# Patient Record
Sex: MALE | Race: WHITE | NOT HISPANIC OR LATINO | Employment: OTHER | URBAN - METROPOLITAN AREA
[De-identification: names, ages, dates, MRNs, and addresses within clinical notes are randomized per-mention and may not be internally consistent; named-entity substitution may affect disease eponyms.]

---

## 2020-04-17 ENCOUNTER — NURSE TRIAGE (OUTPATIENT)
Dept: ADMINISTRATIVE | Facility: CLINIC | Age: 65
End: 2020-04-17

## 2020-04-17 NOTE — TELEPHONE ENCOUNTER
Patient reports cough, loss of smell, muscle pain, fever (100.5), and joint pain. Reports he was recently treated for sinusitis. He reported feeling SOB. Per protocol I advised him to be seen in UC/ED for further evaluation of symptoms. He verbalizes understanding and will call back with any questions.    Reason for Disposition   HIGH RISK patient (e.g., age > 64 years, diabetes, heart or lung disease, weak immune system)    Additional Information   Negative: Difficult to awaken or acting confused (e.g., disoriented, slurred speech)   Negative: SEVERE difficulty breathing (e.g., struggling for each breath, speaks in single words)   Negative: Bluish (or gray) lips or face now   Negative: Shock suspected (e.g., cold/pale/clammy skin, too weak to stand, low BP, rapid pulse)   Negative: Sounds like a life-threatening emergency to the triager   Negative: SEVERE or constant chest pain (Exception: mild central chest pain, present only when coughing)   Negative: MODERATE difficulty breathing (e.g., speaks in phrases, SOB even at rest, pulse 100-120)   Negative: MILD difficulty breathing (e.g., minimal/no SOB at rest, SOB with walking, pulse <100)   Negative: [1] Fever > 101 F (38.3 C) AND [2] age > 60   Negative: Fever > 103 F (39.4 C)   Negative: Chest pain    Protocols used: CORONAVIRUS (COVID-19) DIAGNOSED OR [id number]

## 2020-04-20 ENCOUNTER — HISTORICAL (OUTPATIENT)
Dept: ADMINISTRATIVE | Facility: HOSPITAL | Age: 65
End: 2020-04-20

## 2020-04-20 LAB
ADENOVIRUS: NOT DETECTED
BORDETELLA PARAPERTUSSIS (IS1001): NOT DETECTED
CHLAMYDIA PNEUMONIAE: NOT DETECTED
CORONAVIRUS 229E, COMMON COLD VIRUS: NOT DETECTED
CORONAVIRUS HKU1, COMMON COLD VIRUS: NOT DETECTED
CORONAVIRUS NL63, COMMON COLD VIRUS: NOT DETECTED
CORONAVIRUS OC43, COMMON COLD VIRUS: NOT DETECTED
FLUBV RNA NPH QL NAA+NON-PROBE: NOT DETECTED
HPIV1 RNA NPH QL NAA+NON-PROBE: NOT DETECTED
HPIV2 RNA NPH QL NAA+NON-PROBE: NOT DETECTED
HPIV3 RNA NPH QL NAA+NON-PROBE: NOT DETECTED
HPIV4 RNA NPH QL NAA+NON-PROBE: NOT DETECTED
HUMAN METAPNEUMOVIRUS: NOT DETECTED
INFLUENZA A, MOLECULAR: NOT DETECTED
MYCOPLASMA PNEUMONIAE: NOT DETECTED
RESPIRATORY PANEL CONTROL: NORMAL
RSV RNA NPH QL NAA+NON-PROBE: NOT DETECTED
RV+EV RNA NPH QL NAA+NON-PROBE: NOT DETECTED

## 2020-04-22 LAB — SARS-COV-2 RNA RESP QL NAA+PROBE: DETECTED

## 2020-05-14 LAB — SARS-COV-2 RNA RESP QL NAA+PROBE: DETECTED

## 2021-08-17 ENCOUNTER — APPOINTMENT (OUTPATIENT)
Dept: LAB | Facility: HOSPITAL | Age: 66
End: 2021-08-17
Attending: INTERNAL MEDICINE
Payer: MEDICARE

## 2021-08-17 DIAGNOSIS — R05.9 COUGH: Primary | ICD-10-CM

## 2021-08-17 LAB — SARS-COV-2 RNA RESP QL NAA+PROBE: NOT DETECTED

## 2021-08-17 PROCEDURE — U0002 COVID-19 LAB TEST NON-CDC: HCPCS | Performed by: INTERNAL MEDICINE

## 2022-04-11 PROBLEM — F33.9 DEPRESSION, MAJOR, RECURRENT: Status: ACTIVE | Noted: 2022-04-11

## 2022-04-11 PROBLEM — R91.8 LUNG MASS: Status: ACTIVE | Noted: 2019-12-30

## 2022-04-11 PROBLEM — R60.9 EDEMA: Status: ACTIVE | Noted: 2022-04-11

## 2022-04-11 PROBLEM — J44.9 CHRONIC OBSTRUCTIVE PULMONARY DISEASE: Status: ACTIVE | Noted: 2022-04-11

## 2022-04-11 PROBLEM — J20.9 ACUTE EXACERBATION OF CHRONIC BRONCHITIS: Status: ACTIVE | Noted: 2022-04-11

## 2022-04-11 PROBLEM — R53.83 MALAISE AND FATIGUE: Status: ACTIVE | Noted: 2022-04-11

## 2022-04-11 PROBLEM — J42 ACUTE EXACERBATION OF CHRONIC BRONCHITIS: Status: ACTIVE | Noted: 2022-04-11

## 2022-04-11 PROBLEM — R12 HEARTBURN: Status: ACTIVE | Noted: 2022-04-11

## 2022-04-11 PROBLEM — N40.0 BPH (BENIGN PROSTATIC HYPERPLASIA): Status: ACTIVE | Noted: 2022-04-11

## 2022-04-11 PROBLEM — G47.00 INSOMNIA: Status: ACTIVE | Noted: 2022-04-11

## 2022-04-11 PROBLEM — Z87.891 PERSONAL HISTORY OF TOBACCO USE, PRESENTING HAZARDS TO HEALTH: Status: ACTIVE | Noted: 2022-04-11

## 2022-04-11 PROBLEM — R53.81 MALAISE AND FATIGUE: Status: ACTIVE | Noted: 2022-04-11

## 2022-04-11 PROBLEM — E29.1 HYPOGONADISM, MALE: Status: ACTIVE | Noted: 2022-04-11

## 2022-04-11 PROBLEM — F41.9 ANXIETY DISORDER: Status: ACTIVE | Noted: 2022-04-11

## 2022-04-11 PROBLEM — G47.33 OBSTRUCTIVE SLEEP APNEA SYNDROME: Status: ACTIVE | Noted: 2022-04-11

## 2022-05-06 ENCOUNTER — LAB VISIT (OUTPATIENT)
Dept: LAB | Facility: HOSPITAL | Age: 67
End: 2022-05-06
Attending: INTERNAL MEDICINE
Payer: MEDICARE

## 2022-05-06 DIAGNOSIS — E78.5 HYPERLIPIDEMIA, UNSPECIFIED HYPERLIPIDEMIA TYPE: ICD-10-CM

## 2022-05-06 DIAGNOSIS — Z11.59 ENCOUNTER FOR HEPATITIS C SCREENING TEST FOR LOW RISK PATIENT: ICD-10-CM

## 2022-05-06 DIAGNOSIS — Z12.5 ENCOUNTER FOR SCREENING FOR MALIGNANT NEOPLASM OF PROSTATE: ICD-10-CM

## 2022-05-06 DIAGNOSIS — N40.0 BENIGN PROSTATIC HYPERPLASIA, UNSPECIFIED WHETHER LOWER URINARY TRACT SYMPTOMS PRESENT: ICD-10-CM

## 2022-05-06 LAB
ALBUMIN SERPL BCP-MCNC: 3.2 G/DL (ref 3.5–5.2)
ALP SERPL-CCNC: 75 U/L (ref 55–135)
ALT SERPL W/O P-5'-P-CCNC: 25 U/L (ref 10–44)
ANION GAP SERPL CALC-SCNC: 6 MMOL/L (ref 8–16)
AST SERPL-CCNC: 22 U/L (ref 10–40)
BASOPHILS # BLD AUTO: 0.09 K/UL (ref 0–0.2)
BASOPHILS NFR BLD: 1.4 % (ref 0–1.9)
BILIRUB SERPL-MCNC: 0.3 MG/DL (ref 0.1–1)
BUN SERPL-MCNC: 22 MG/DL (ref 8–23)
CALCIUM SERPL-MCNC: 8.5 MG/DL (ref 8.7–10.5)
CHLORIDE SERPL-SCNC: 106 MMOL/L (ref 95–110)
CHOLEST SERPL-MCNC: 149 MG/DL (ref 120–199)
CHOLEST/HDLC SERPL: 3.3 {RATIO} (ref 2–5)
CO2 SERPL-SCNC: 28 MMOL/L (ref 23–29)
COMPLEXED PSA SERPL-MCNC: 0.05 NG/ML (ref 0–4)
CREAT SERPL-MCNC: 1 MG/DL (ref 0.5–1.4)
DIFFERENTIAL METHOD: ABNORMAL
EOSINOPHIL # BLD AUTO: 0.8 K/UL (ref 0–0.5)
EOSINOPHIL NFR BLD: 12 % (ref 0–8)
ERYTHROCYTE [DISTWIDTH] IN BLOOD BY AUTOMATED COUNT: 17.6 % (ref 11.5–14.5)
EST. GFR  (AFRICAN AMERICAN): >60 ML/MIN/1.73 M^2
EST. GFR  (NON AFRICAN AMERICAN): >60 ML/MIN/1.73 M^2
GLUCOSE SERPL-MCNC: 87 MG/DL (ref 70–110)
HCT VFR BLD AUTO: 32.1 % (ref 40–54)
HDLC SERPL-MCNC: 45 MG/DL (ref 40–75)
HDLC SERPL: 30.2 % (ref 20–50)
HGB BLD-MCNC: 10 G/DL (ref 14–18)
IMM GRANULOCYTES # BLD AUTO: 0.01 K/UL (ref 0–0.04)
IMM GRANULOCYTES NFR BLD AUTO: 0.2 % (ref 0–0.5)
LDLC SERPL CALC-MCNC: 94.4 MG/DL (ref 63–159)
LYMPHOCYTES # BLD AUTO: 1.9 K/UL (ref 1–4.8)
LYMPHOCYTES NFR BLD: 29.2 % (ref 18–48)
MCH RBC QN AUTO: 25.3 PG (ref 27–31)
MCHC RBC AUTO-ENTMCNC: 31.2 G/DL (ref 32–36)
MCV RBC AUTO: 81 FL (ref 82–98)
MONOCYTES # BLD AUTO: 0.9 K/UL (ref 0.3–1)
MONOCYTES NFR BLD: 13.4 % (ref 4–15)
NEUTROPHILS # BLD AUTO: 2.8 K/UL (ref 1.8–7.7)
NEUTROPHILS NFR BLD: 43.8 % (ref 38–73)
NONHDLC SERPL-MCNC: 104 MG/DL
NRBC BLD-RTO: 0 /100 WBC
PLATELET # BLD AUTO: 381 K/UL (ref 150–450)
PMV BLD AUTO: 10.2 FL (ref 9.2–12.9)
POTASSIUM SERPL-SCNC: 4.2 MMOL/L (ref 3.5–5.1)
PROT SERPL-MCNC: 7.5 G/DL (ref 6–8.4)
RBC # BLD AUTO: 3.96 M/UL (ref 4.6–6.2)
SODIUM SERPL-SCNC: 140 MMOL/L (ref 136–145)
TRIGL SERPL-MCNC: 48 MG/DL (ref 30–150)
TSH SERPL DL<=0.005 MIU/L-ACNC: 0.96 UIU/ML (ref 0.4–4)
WBC # BLD AUTO: 6.43 K/UL (ref 3.9–12.7)

## 2022-05-06 PROCEDURE — 80053 COMPREHEN METABOLIC PANEL: CPT | Performed by: INTERNAL MEDICINE

## 2022-05-06 PROCEDURE — 85025 COMPLETE CBC W/AUTO DIFF WBC: CPT | Performed by: INTERNAL MEDICINE

## 2022-05-06 PROCEDURE — 84153 ASSAY OF PSA TOTAL: CPT | Performed by: INTERNAL MEDICINE

## 2022-05-06 PROCEDURE — 86803 HEPATITIS C AB TEST: CPT | Performed by: INTERNAL MEDICINE

## 2022-05-06 PROCEDURE — 84443 ASSAY THYROID STIM HORMONE: CPT | Performed by: INTERNAL MEDICINE

## 2022-05-06 PROCEDURE — 80061 LIPID PANEL: CPT | Performed by: INTERNAL MEDICINE

## 2022-05-06 PROCEDURE — 36415 COLL VENOUS BLD VENIPUNCTURE: CPT | Performed by: INTERNAL MEDICINE

## 2022-05-13 LAB — ARUP MISCELLANEOUS TEST 1: NORMAL

## 2022-05-16 ENCOUNTER — LAB VISIT (OUTPATIENT)
Dept: LAB | Facility: HOSPITAL | Age: 67
End: 2022-05-16
Attending: INTERNAL MEDICINE
Payer: MEDICARE

## 2022-05-16 DIAGNOSIS — E53.8 DEFICIENCY OF OTHER SPECIFIED B GROUP VITAMINS: ICD-10-CM

## 2022-05-16 DIAGNOSIS — D51.8 OTHER VITAMIN B12 DEFICIENCY ANEMIA: ICD-10-CM

## 2022-05-16 DIAGNOSIS — R89.9 ABNORMAL LABORATORY TEST RESULT: ICD-10-CM

## 2022-05-16 DIAGNOSIS — D64.9 ANEMIA, UNSPECIFIED TYPE: ICD-10-CM

## 2022-05-16 LAB
FERRITIN SERPL-MCNC: 14 NG/ML (ref 20–300)
FOLATE SERPL-MCNC: 11.6 NG/ML (ref 4–24)
IRON SATN MFR SERPL: 7 % (ref 20–50)
IRON SERPL-MCNC: 32 UG/DL (ref 45–160)
RETICS/RBC NFR AUTO: 1.3 % (ref 0.4–2)
TOTAL IRON BINDING CAPACITY: 439 UG/DL (ref 250–450)
VIT B12 SERPL-MCNC: 481 PG/ML (ref 210–950)

## 2022-05-16 PROCEDURE — 82728 ASSAY OF FERRITIN: CPT | Performed by: INTERNAL MEDICINE

## 2022-05-16 PROCEDURE — 84165 PROTEIN E-PHORESIS SERUM: CPT | Mod: 26,,, | Performed by: PATHOLOGY

## 2022-05-16 PROCEDURE — 82746 ASSAY OF FOLIC ACID SERUM: CPT | Performed by: INTERNAL MEDICINE

## 2022-05-16 PROCEDURE — 82607 VITAMIN B-12: CPT | Performed by: INTERNAL MEDICINE

## 2022-05-16 PROCEDURE — 84165 PROTEIN E-PHORESIS SERUM: CPT | Performed by: INTERNAL MEDICINE

## 2022-05-16 PROCEDURE — 85045 AUTOMATED RETICULOCYTE COUNT: CPT | Performed by: INTERNAL MEDICINE

## 2022-05-16 PROCEDURE — 84165 PATHOLOGIST INTERPRETATION SPE: ICD-10-PCS | Mod: 26,,, | Performed by: PATHOLOGY

## 2022-05-16 PROCEDURE — 83540 ASSAY OF IRON: CPT | Performed by: INTERNAL MEDICINE

## 2022-05-16 PROCEDURE — 36415 COLL VENOUS BLD VENIPUNCTURE: CPT | Performed by: INTERNAL MEDICINE

## 2022-05-17 LAB
ALBUMIN SERPL ELPH-MCNC: 3.91 G/DL (ref 3.35–5.55)
ALPHA1 GLOB SERPL ELPH-MCNC: 0.29 G/DL (ref 0.17–0.41)
ALPHA2 GLOB SERPL ELPH-MCNC: 0.86 G/DL (ref 0.43–0.99)
B-GLOBULIN SERPL ELPH-MCNC: 1.11 G/DL (ref 0.5–1.1)
GAMMA GLOB SERPL ELPH-MCNC: 1.52 G/DL (ref 0.67–1.58)
PROT 24H UR-MRATE: 202 MG/SPEC (ref 0–100)
PROT SERPL-MCNC: 7.7 G/DL (ref 6–8.4)
PROT UR-MCNC: 8.6 MG/DL (ref 0–15)
URINE COLLECTION DURATION: 24 HR
URINE VOLUME: 2350 ML

## 2022-05-17 PROCEDURE — 84166 PROTEIN E-PHORESIS/URINE/CSF: CPT | Mod: 26,,, | Performed by: PATHOLOGY

## 2022-05-17 PROCEDURE — 84166 PATHOLOGIST INTERPRETATION UPE: ICD-10-PCS | Mod: 26,,, | Performed by: PATHOLOGY

## 2022-05-17 PROCEDURE — 82272 OCCULT BLD FECES 1-3 TESTS: CPT | Performed by: INTERNAL MEDICINE

## 2022-05-17 PROCEDURE — 84166 PROTEIN E-PHORESIS/URINE/CSF: CPT | Performed by: INTERNAL MEDICINE

## 2022-05-17 PROCEDURE — 84156 ASSAY OF PROTEIN URINE: CPT | Performed by: INTERNAL MEDICINE

## 2022-05-18 LAB — PATHOLOGIST INTERPRETATION SPE: NORMAL

## 2022-05-19 LAB — PROT PATTERN UR ELPH-IMP: NORMAL

## 2022-05-20 LAB
OB PNL STL: NEGATIVE
PATHOLOGIST INTERPRETATION UPE: NORMAL

## 2022-06-03 ENCOUNTER — HOSPITAL ENCOUNTER (OUTPATIENT)
Dept: RADIOLOGY | Facility: HOSPITAL | Age: 67
Discharge: HOME | End: 2022-06-03
Attending: INTERNAL MEDICINE
Payer: MEDICARE

## 2022-06-03 DIAGNOSIS — Z87.891 PERSONAL HISTORY OF TOBACCO USE, PRESENTING HAZARDS TO HEALTH: ICD-10-CM

## 2022-06-03 PROBLEM — R91.1 PULMONARY NODULE: Status: ACTIVE | Noted: 2019-12-30

## 2022-06-03 PROBLEM — I48.91 ATRIAL FIBRILLATION: Status: ACTIVE | Noted: 2022-06-03

## 2022-06-03 PROBLEM — D50.9 IRON DEFICIENCY ANEMIA: Status: ACTIVE | Noted: 2022-06-03

## 2022-06-03 PROCEDURE — 71271 CT THORAX LUNG CANCER SCR C-: CPT | Mod: TC

## 2022-09-29 ENCOUNTER — HOSPITAL ENCOUNTER (EMERGENCY)
Facility: HOSPITAL | Age: 67
Discharge: HOME | End: 2022-09-29
Attending: STUDENT IN AN ORGANIZED HEALTH CARE EDUCATION/TRAINING PROGRAM
Payer: MEDICARE

## 2022-09-29 VITALS
DIASTOLIC BLOOD PRESSURE: 66 MMHG | HEART RATE: 97 BPM | HEIGHT: 72 IN | WEIGHT: 215 LBS | TEMPERATURE: 100 F | BODY MASS INDEX: 29.12 KG/M2 | SYSTOLIC BLOOD PRESSURE: 109 MMHG | RESPIRATION RATE: 18 BRPM | OXYGEN SATURATION: 94 %

## 2022-09-29 DIAGNOSIS — N12 PYELONEPHRITIS: Primary | ICD-10-CM

## 2022-09-29 PROCEDURE — 96372 THER/PROPH/DIAG INJ SC/IM: CPT | Mod: 59 | Performed by: STUDENT IN AN ORGANIZED HEALTH CARE EDUCATION/TRAINING PROGRAM

## 2022-09-29 PROCEDURE — 63600175 PHARM REV CODE 636 W HCPCS: Performed by: STUDENT IN AN ORGANIZED HEALTH CARE EDUCATION/TRAINING PROGRAM

## 2022-09-29 PROCEDURE — 99285 EMERGENCY DEPT VISIT HI MDM: CPT | Mod: 25,GO

## 2022-09-29 RX ORDER — KETOROLAC TROMETHAMINE 30 MG/ML
30 INJECTION, SOLUTION INTRAMUSCULAR; INTRAVENOUS
Status: COMPLETED | OUTPATIENT
Start: 2022-09-29 | End: 2022-09-29

## 2022-09-29 RX ORDER — OXYCODONE AND ACETAMINOPHEN 5; 325 MG/1; MG/1
1 TABLET ORAL EVERY 6 HOURS PRN
Qty: 12 TABLET | Refills: 0 | Status: SHIPPED | OUTPATIENT
Start: 2022-09-29 | End: 2023-09-26

## 2022-09-29 RX ADMIN — KETOROLAC TROMETHAMINE 30 MG: 30 INJECTION, SOLUTION INTRAMUSCULAR; INTRAVENOUS at 12:09

## 2022-09-29 NOTE — ED PROVIDER NOTES
Encounter Date: 2022       History     Chief Complaint   Patient presents with    Flank Pain     Pt c/o right sided flank pain. Pt diagnosed with pyelonephritis this morning and given antibiotics. Pt still c/o pain and Dr. Menjivar is requesting the ER do a CT scan.      67-year-old male sent over by PCP for CT scan due to right flank pain.  Patient was seen here earlier this morning for similar complaint with urinalysis being positive.  Patient was diagnosed with pyelonephritis but continues to have pain.  Has not tried anything for pain.  Denies any vomiting, diarrhea, shortness of breath    Review of patient's allergies indicates:  No Known Allergies  Past Medical History:   Diagnosis Date    Arthralgia of knee     Atrial fibrillation     Edema     GERD (gastroesophageal reflux disease)     Heartburn     Hyperlipidemia     Insomnia     Iron deficiency anemia, unspecified     Malaise and fatigue     Orthostasis     Sleep apnea     Unspecified chronic bronchitis      Past Surgical History:   Procedure Laterality Date    CARDIOVERSION N/A 2022    Procedure: Cardioversion;  Surgeon: William Angel MD;  Location: [facility];  Service: Cardiology;  Laterality: N/A;    COLONOSCOPY  2011    Dr. Chavez    UNDESCENDED TESTICLE EXPLORATION N/A      Family History   Problem Relation Age of Onset    Diabetes Mother     Heart murmur Mother     Cancer Father     Tuberculosis Father     Cancer Brother      Social History     Tobacco Use    Smoking status: Former     Types: Cigarettes     Quit date:      Years since quittin.7    Smokeless tobacco: Never   Substance Use Topics    Alcohol use: Not Currently    Drug use: Never     Review of Systems   Constitutional: Negative.    HENT: Negative.     Respiratory: Negative.     Cardiovascular: Negative.    Gastrointestinal: Negative.    Genitourinary:  Positive for dysuria and flank pain.   Skin: Negative.    Neurological: Negative.    Psychiatric/Behavioral:  Negative.     All other systems reviewed and are negative.    Physical Exam     Initial Vitals [09/29/22 1200]   BP Pulse Resp Temp SpO2   131/66 90 16 99.6 °F (37.6 °C) 95 %      MAP       --         Physical Exam    Nursing note and vitals reviewed.  Constitutional: Vital signs are normal. He appears well-developed and well-nourished.   HENT:   Head: Normocephalic and atraumatic.   Eyes: Conjunctivae and lids are normal.   Neck: Trachea normal. Neck supple.   Cardiovascular:  Normal rate, regular rhythm, normal heart sounds and normal pulses.           Pulmonary/Chest: Breath sounds normal. He has no wheezes. He has no rhonchi.   Abdominal: Abdomen is soft. Bowel sounds are normal. He exhibits no distension. There is no abdominal tenderness. There is no rebound and no guarding.   Genitourinary:    Genitourinary Comments: Right CVA tenderness     Musculoskeletal:         General: Normal range of motion.      Cervical back: Neck supple.     Neurological: He is alert and oriented to person, place, and time. He has normal strength. GCS eye subscore is 4. GCS verbal subscore is 5. GCS motor subscore is 6.   Skin: Skin is warm. Capillary refill takes less than 2 seconds.   Psychiatric: He has a normal mood and affect. His speech is normal. Thought content normal.       ED Course   Procedures  Labs Reviewed - No data to display       Imaging Results              CT Abdomen Pelvis  Without Contrast (Final result)  Result time 09/29/22 13:37:07      Final result by Genaro Stephens MD (09/29/22 13:37:07)                   Impression:      1. Right perinephric stranding without hydroureteronephrosis.  Pyelonephritis is in the differential.  Correlate with clinical presentation.  2. Intermediate density exophytic lesion arising from the upper pole of the right kidney.  This may indicate a heterogeneous dense cyst, but follow-up ultrasound is recommended to exclude a solid lesion.  There is an additional probable dense cyst at  the upper pole of the left kidney.  3. Large single gallstone without gallbladder wall thickening or pericholecystic fluid/inflammatory change  4. Colonic diverticulosis without evidence for diverticulitis  5. Additional chronic findings detailed above.      Electronically signed by: Genaro Stephens MD  Date:    09/29/2022  Time:    13:37               Narrative:    EXAMINATION:  CT ABDOMEN PELVIS WITHOUT CONTRAST    CLINICAL HISTORY:  Right lower quadrant and right flank pain flank pain, kidney stone suspected;    TECHNIQUE:  Axial CT images were obtained from the lung bases through the pelvis without oral or intravenous contrast.  Multiplanar reconstructions evaluated.  Iterative reconstruction technique was used.  CT/Cardiac nuclear examinations in the past 12 months: 1    COMPARISON:  No priors available    FINDINGS:  Patient respiratory motion artifact in the imaged lung bases with no significant focal abnormality identified.  Dense coronary artery atherosclerotic calcification with prosthetic cardiac valve device in place.  Large hiatal hernia present containing the gastric fundus and proximal to mid aspect of the gastric body.  Lack of intravenous and oral contrast limits evaluation of the abdominal and pelvic structures.  Unenhanced liver is unremarkable.  A normal spleen is not seen.  There is soft tissue density at the splenic bed, likely regenerating splenic tissue spanning 5.2 cm in diameter.  Adrenals and pancreas unremarkable.  Large single radiodense gallstone within the gallbladder measuring up to 2.7 cm in greatest axial dimension.  Exophytic intermediate density 2.7 cm heterogeneous hypodense lesion upper pole right kidney.  Few cysts in the right kidney largest parapelvic measuring 2.4 cm in diameter.  At least 5 caliceal calculi within the right kidney largest measuring 3 mm.  Right perinephric inflammatory stranding and questionable urothelial thickening of the proximal right ureter.  Remainder of  the right ureter is collapsed.  No hydronephrosis.  1.3 cm probable hyperdense cyst upper pole left kidney.  3.3 cm cyst with parapelvic extension left kidney and additional exophytic cyst associated with medial parenchyma of the left kidney spanning 3.2 cm.  Punctate 1 mm caliceal calculi left kidney.  No urinary bladder wall thickening.  Moderate diverticulosis of the descending colon.  No bowel wall thickening or pattern of obstruction.  Normal appendix.  Moderate aortoiliac atherosclerotic calcification.  No adenopathy, free air, or free fluid.  No acute osseous change.                                       Medications   ketorolac injection 30 mg (30 mg Intramuscular Given 9/29/22 1223)     Medical Decision Making:   Initial Assessment:   67-year-old male sent over by PCP for CT scan due to right flank pain.  Afebrile vitals stable.  Physical noted.  Will do CT abdomen pelvis to rule out kidney stone.  Treat symptomatically.  Re-evaluate  Clinical Tests:   Radiological Study: Reviewed and Ordered  Labs and imaging noted.  Patient pain control.  Pyelonephritis.  Treat pain.  Imaging result noted.  Advised to follow-up with PCP for further evaluation.  Continue antibiotics.  Return precaution                        Clinical Impression:   Final diagnoses:  [N12] Pyelonephritis (Primary)      ED Disposition Condition    Discharge Stable          ED Prescriptions       Medication Sig Dispense Start Date End Date Auth. Provider    oxyCODONE-acetaminophen (PERCOCET) 5-325 mg per tablet Take 1 tablet by mouth every 6 (six) hours as needed for Pain. 12 tablet 9/29/2022 -- Omar Ballesteros MD          Follow-up Information       Follow up With Specialties Details Why Contact Info    Jd Menjivar III, MD Internal Medicine In 2 days  [address] [place] [id number]  [phone]               Omar Ballesteros MD  09/29/22 [id number]

## 2023-03-30 PROBLEM — K80.20 CALCULUS OF GALLBLADDER WITHOUT CHOLECYSTITIS WITHOUT OBSTRUCTION: Status: ACTIVE | Noted: 2023-03-30

## 2023-05-23 ENCOUNTER — PATIENT MESSAGE (OUTPATIENT)
Dept: RESEARCH | Facility: HOSPITAL | Age: 68
End: 2023-05-23
Payer: MEDICARE

## 2023-08-02 PROBLEM — K80.20 CALCULUS OF GALLBLADDER WITHOUT CHOLECYSTITIS WITHOUT OBSTRUCTION: Status: RESOLVED | Noted: 2023-03-30 | Resolved: 2023-08-02

## 2023-08-03 ENCOUNTER — LAB VISIT (OUTPATIENT)
Dept: LAB | Facility: HOSPITAL | Age: 68
End: 2023-08-03
Attending: INTERNAL MEDICINE
Payer: MEDICARE

## 2023-08-03 DIAGNOSIS — R53.81 MALAISE AND FATIGUE: ICD-10-CM

## 2023-08-03 DIAGNOSIS — Z12.5 PROSTATE CANCER SCREENING: ICD-10-CM

## 2023-08-03 DIAGNOSIS — Z11.59 ENCOUNTER FOR HEPATITIS C SCREENING TEST FOR LOW RISK PATIENT: ICD-10-CM

## 2023-08-03 DIAGNOSIS — D50.8 OTHER IRON DEFICIENCY ANEMIA: ICD-10-CM

## 2023-08-03 DIAGNOSIS — R53.83 MALAISE AND FATIGUE: ICD-10-CM

## 2023-08-03 DIAGNOSIS — E78.2 MIXED HYPERLIPIDEMIA: ICD-10-CM

## 2023-08-03 DIAGNOSIS — Z86.39 PERSONAL HISTORY OF OTHER ENDOCRINE, NUTRITIONAL AND METABOLIC DISEASE: ICD-10-CM

## 2023-08-03 LAB
ALBUMIN SERPL BCP-MCNC: 3.3 G/DL (ref 3.5–5.2)
ALP SERPL-CCNC: 82 U/L (ref 55–135)
ALT SERPL W/O P-5'-P-CCNC: 25 U/L (ref 10–44)
ANION GAP SERPL CALC-SCNC: 7 MMOL/L (ref 8–16)
AST SERPL-CCNC: 20 U/L (ref 10–40)
BASOPHILS # BLD AUTO: 0.05 K/UL (ref 0–0.2)
BASOPHILS NFR BLD: 0.7 % (ref 0–1.9)
BILIRUB SERPL-MCNC: 0.5 MG/DL (ref 0.1–1)
BUN SERPL-MCNC: 13 MG/DL (ref 8–23)
CALCIUM SERPL-MCNC: 8.6 MG/DL (ref 8.7–10.5)
CHLORIDE SERPL-SCNC: 107 MMOL/L (ref 95–110)
CHOLEST SERPL-MCNC: 165 MG/DL (ref 120–199)
CHOLEST/HDLC SERPL: 4.7 {RATIO} (ref 2–5)
CO2 SERPL-SCNC: 27 MMOL/L (ref 23–29)
COMPLEXED PSA SERPL-MCNC: 0.04 NG/ML (ref 0–4)
CREAT SERPL-MCNC: 0.9 MG/DL (ref 0.5–1.4)
DIFFERENTIAL METHOD: ABNORMAL
EOSINOPHIL # BLD AUTO: 0.6 K/UL (ref 0–0.5)
EOSINOPHIL NFR BLD: 8.6 % (ref 0–8)
ERYTHROCYTE [DISTWIDTH] IN BLOOD BY AUTOMATED COUNT: 13.8 % (ref 11.5–14.5)
EST. GFR  (NO RACE VARIABLE): >60 ML/MIN/1.73 M^2
ESTIMATED AVG GLUCOSE: 111 MG/DL (ref 68–131)
FERRITIN SERPL-MCNC: 182 NG/ML (ref 20–300)
GLUCOSE SERPL-MCNC: 106 MG/DL (ref 70–110)
HBA1C MFR BLD: 5.5 % (ref 4–5.6)
HCT VFR BLD AUTO: 40.4 % (ref 40–54)
HCV AB SERPL QL IA: NORMAL
HDLC SERPL-MCNC: 35 MG/DL (ref 40–75)
HDLC SERPL: 21.2 % (ref 20–50)
HGB BLD-MCNC: 13.5 G/DL (ref 14–18)
IMM GRANULOCYTES # BLD AUTO: 0.02 K/UL (ref 0–0.04)
IMM GRANULOCYTES NFR BLD AUTO: 0.3 % (ref 0–0.5)
IRON SATN MFR SERPL: 27 % (ref 20–50)
IRON SERPL-MCNC: 76 UG/DL (ref 45–160)
LDLC SERPL CALC-MCNC: 108.2 MG/DL (ref 63–159)
LYMPHOCYTES # BLD AUTO: 2.4 K/UL (ref 1–4.8)
LYMPHOCYTES NFR BLD: 33.6 % (ref 18–48)
MCH RBC QN AUTO: 31.5 PG (ref 27–31)
MCHC RBC AUTO-ENTMCNC: 33.4 G/DL (ref 32–36)
MCV RBC AUTO: 94 FL (ref 82–98)
MONOCYTES # BLD AUTO: 0.9 K/UL (ref 0.3–1)
MONOCYTES NFR BLD: 12.9 % (ref 4–15)
NEUTROPHILS # BLD AUTO: 3.1 K/UL (ref 1.8–7.7)
NEUTROPHILS NFR BLD: 43.9 % (ref 38–73)
NONHDLC SERPL-MCNC: 130 MG/DL
NRBC BLD-RTO: 0 /100 WBC
PLATELET # BLD AUTO: 348 K/UL (ref 150–450)
PMV BLD AUTO: 10.2 FL (ref 9.2–12.9)
POTASSIUM SERPL-SCNC: 4.2 MMOL/L (ref 3.5–5.1)
PROT SERPL-MCNC: 7.5 G/DL (ref 6–8.4)
RBC # BLD AUTO: 4.28 M/UL (ref 4.6–6.2)
SODIUM SERPL-SCNC: 141 MMOL/L (ref 136–145)
TOTAL IRON BINDING CAPACITY: 279 UG/DL (ref 250–450)
TRIGL SERPL-MCNC: 109 MG/DL (ref 30–150)
TSH SERPL DL<=0.005 MIU/L-ACNC: 1.45 UIU/ML (ref 0.4–4)
WBC # BLD AUTO: 6.99 K/UL (ref 3.9–12.7)

## 2023-08-03 PROCEDURE — 80053 COMPREHEN METABOLIC PANEL: CPT | Performed by: INTERNAL MEDICINE

## 2023-08-03 PROCEDURE — 36415 COLL VENOUS BLD VENIPUNCTURE: CPT | Performed by: INTERNAL MEDICINE

## 2023-08-03 PROCEDURE — 82728 ASSAY OF FERRITIN: CPT | Performed by: INTERNAL MEDICINE

## 2023-08-03 PROCEDURE — 83540 ASSAY OF IRON: CPT | Performed by: INTERNAL MEDICINE

## 2023-08-03 PROCEDURE — 83550 IRON BINDING TEST: CPT | Performed by: INTERNAL MEDICINE

## 2023-08-03 PROCEDURE — 86803 HEPATITIS C AB TEST: CPT | Performed by: INTERNAL MEDICINE

## 2023-08-03 PROCEDURE — 85025 COMPLETE CBC W/AUTO DIFF WBC: CPT | Performed by: INTERNAL MEDICINE

## 2023-08-03 PROCEDURE — 83036 HEMOGLOBIN GLYCOSYLATED A1C: CPT | Performed by: INTERNAL MEDICINE

## 2023-08-03 PROCEDURE — 84153 ASSAY OF PSA TOTAL: CPT | Performed by: INTERNAL MEDICINE

## 2023-08-03 PROCEDURE — 80061 LIPID PANEL: CPT | Performed by: INTERNAL MEDICINE

## 2023-08-03 PROCEDURE — 84443 ASSAY THYROID STIM HORMONE: CPT | Performed by: INTERNAL MEDICINE

## 2023-08-11 ENCOUNTER — HOSPITAL ENCOUNTER (OUTPATIENT)
Dept: RADIOLOGY | Facility: HOSPITAL | Age: 68
Discharge: HOME | End: 2023-08-11
Attending: INTERNAL MEDICINE
Payer: MEDICARE

## 2023-08-11 DIAGNOSIS — R91.1 SOLITARY PULMONARY NODULE: ICD-10-CM

## 2023-08-11 DIAGNOSIS — Z87.891 PERSONAL HISTORY OF TOBACCO USE, PRESENTING HAZARDS TO HEALTH: ICD-10-CM

## 2023-08-11 PROCEDURE — 71271 CT THORAX LUNG CANCER SCR C-: CPT | Mod: TC

## 2023-08-30 PROBLEM — K44.9 HIATAL HERNIA: Status: ACTIVE | Noted: 2023-08-30

## 2023-08-30 PROBLEM — J42 ACUTE EXACERBATION OF CHRONIC BRONCHITIS: Status: RESOLVED | Noted: 2022-04-11 | Resolved: 2023-08-30

## 2023-08-30 PROBLEM — N28.1 SIMPLE RENAL CYST: Status: ACTIVE | Noted: 2023-08-30

## 2023-08-30 PROBLEM — J20.9 ACUTE EXACERBATION OF CHRONIC BRONCHITIS: Status: RESOLVED | Noted: 2022-04-11 | Resolved: 2023-08-30

## 2023-09-12 ENCOUNTER — OFFICE VISIT (OUTPATIENT)
Dept: SURGERY | Facility: CLINIC | Age: 68
End: 2023-09-12
Payer: MEDICARE

## 2023-09-12 VITALS
HEART RATE: 91 BPM | BODY MASS INDEX: 30.19 KG/M2 | WEIGHT: 222.88 LBS | TEMPERATURE: 98 F | SYSTOLIC BLOOD PRESSURE: 131 MMHG | HEIGHT: 72 IN | DIASTOLIC BLOOD PRESSURE: 84 MMHG | OXYGEN SATURATION: 96 %

## 2023-09-12 DIAGNOSIS — D50.8 OTHER IRON DEFICIENCY ANEMIA: ICD-10-CM

## 2023-09-12 DIAGNOSIS — Z12.11 COLON CANCER SCREENING: Primary | ICD-10-CM

## 2023-09-12 PROCEDURE — 3075F SYST BP GE 130 - 139MM HG: CPT | Mod: CPTII,S$GLB,, | Performed by: STUDENT IN AN ORGANIZED HEALTH CARE EDUCATION/TRAINING PROGRAM

## 2023-09-12 PROCEDURE — 3008F BODY MASS INDEX DOCD: CPT | Mod: CPTII,S$GLB,, | Performed by: STUDENT IN AN ORGANIZED HEALTH CARE EDUCATION/TRAINING PROGRAM

## 2023-09-12 PROCEDURE — 3075F PR MOST RECENT SYSTOLIC BLOOD PRESS GE 130-139MM HG: ICD-10-PCS | Mod: CPTII,S$GLB,, | Performed by: STUDENT IN AN ORGANIZED HEALTH CARE EDUCATION/TRAINING PROGRAM

## 2023-09-12 PROCEDURE — 3044F PR MOST RECENT HEMOGLOBIN A1C LEVEL <7.0%: ICD-10-PCS | Mod: CPTII,S$GLB,, | Performed by: STUDENT IN AN ORGANIZED HEALTH CARE EDUCATION/TRAINING PROGRAM

## 2023-09-12 PROCEDURE — 99999 PR PBB SHADOW E&M-EST. PATIENT-LVL V: CPT | Mod: PBBFAC,,, | Performed by: STUDENT IN AN ORGANIZED HEALTH CARE EDUCATION/TRAINING PROGRAM

## 2023-09-12 PROCEDURE — 99204 OFFICE O/P NEW MOD 45 MIN: CPT | Mod: S$GLB,,, | Performed by: STUDENT IN AN ORGANIZED HEALTH CARE EDUCATION/TRAINING PROGRAM

## 2023-09-12 PROCEDURE — 99204 PR OFFICE/OUTPT VISIT, NEW, LEVL IV, 45-59 MIN: ICD-10-PCS | Mod: S$GLB,,, | Performed by: STUDENT IN AN ORGANIZED HEALTH CARE EDUCATION/TRAINING PROGRAM

## 2023-09-12 PROCEDURE — 3079F DIAST BP 80-89 MM HG: CPT | Mod: CPTII,S$GLB,, | Performed by: STUDENT IN AN ORGANIZED HEALTH CARE EDUCATION/TRAINING PROGRAM

## 2023-09-12 PROCEDURE — 99999 PR PBB SHADOW E&M-EST. PATIENT-LVL V: ICD-10-PCS | Mod: PBBFAC,,, | Performed by: STUDENT IN AN ORGANIZED HEALTH CARE EDUCATION/TRAINING PROGRAM

## 2023-09-12 PROCEDURE — 3079F PR MOST RECENT DIASTOLIC BLOOD PRESSURE 80-89 MM HG: ICD-10-PCS | Mod: CPTII,S$GLB,, | Performed by: STUDENT IN AN ORGANIZED HEALTH CARE EDUCATION/TRAINING PROGRAM

## 2023-09-12 PROCEDURE — 3008F PR BODY MASS INDEX (BMI) DOCUMENTED: ICD-10-PCS | Mod: CPTII,S$GLB,, | Performed by: STUDENT IN AN ORGANIZED HEALTH CARE EDUCATION/TRAINING PROGRAM

## 2023-09-12 PROCEDURE — 3044F HG A1C LEVEL LT 7.0%: CPT | Mod: CPTII,S$GLB,, | Performed by: STUDENT IN AN ORGANIZED HEALTH CARE EDUCATION/TRAINING PROGRAM

## 2023-09-12 RX ORDER — SODIUM, POTASSIUM,MAG SULFATES 17.5-3.13G
1 SOLUTION, RECONSTITUTED, ORAL ORAL 2 TIMES DAILY
Qty: 1 KIT | Refills: 0 | Status: SHIPPED | OUTPATIENT
Start: 2023-09-12 | End: 2023-09-13

## 2023-09-12 RX ORDER — SODIUM CHLORIDE 9 MG/ML
INJECTION, SOLUTION INTRAVENOUS CONTINUOUS
Status: CANCELLED | OUTPATIENT
Start: 2023-09-12

## 2023-09-12 RX ORDER — SODIUM CHLORIDE 0.9 % (FLUSH) 0.9 %
10 SYRINGE (ML) INJECTION
Status: CANCELLED | OUTPATIENT
Start: 2023-09-12

## 2023-09-15 ENCOUNTER — HOSPITAL ENCOUNTER (OUTPATIENT)
Dept: PULMONOLOGY | Facility: HOSPITAL | Age: 68
Discharge: HOME | End: 2023-09-15
Attending: STUDENT IN AN ORGANIZED HEALTH CARE EDUCATION/TRAINING PROGRAM
Payer: MEDICARE

## 2023-09-15 ENCOUNTER — HOSPITAL ENCOUNTER (OUTPATIENT)
Dept: PREADMISSION TESTING | Facility: HOSPITAL | Age: 68
Discharge: HOME | End: 2023-09-15
Attending: STUDENT IN AN ORGANIZED HEALTH CARE EDUCATION/TRAINING PROGRAM
Payer: MEDICARE

## 2023-09-15 VITALS — HEIGHT: 72 IN | WEIGHT: 222 LBS | BODY MASS INDEX: 30.07 KG/M2

## 2023-09-15 DIAGNOSIS — Z01.818 PREOP TESTING: Primary | ICD-10-CM

## 2023-09-15 DIAGNOSIS — Z01.818 PREOP TESTING: ICD-10-CM

## 2023-09-15 LAB
ALBUMIN SERPL BCP-MCNC: 3.8 G/DL (ref 3.5–5.2)
ALP SERPL-CCNC: 69 U/L (ref 55–135)
ALT SERPL W/O P-5'-P-CCNC: 27 U/L (ref 10–44)
ANION GAP SERPL CALC-SCNC: 2 MMOL/L (ref 8–16)
AST SERPL-CCNC: 20 U/L (ref 10–40)
BILIRUB SERPL-MCNC: 0.4 MG/DL (ref 0.1–1)
BUN SERPL-MCNC: 12 MG/DL (ref 8–23)
CALCIUM SERPL-MCNC: 9 MG/DL (ref 8.7–10.5)
CHLORIDE SERPL-SCNC: 107 MMOL/L (ref 95–110)
CO2 SERPL-SCNC: 31 MMOL/L (ref 23–29)
CREAT SERPL-MCNC: 1.2 MG/DL (ref 0.5–1.4)
EST. GFR  (NO RACE VARIABLE): >60 ML/MIN/1.73 M^2
GLUCOSE SERPL-MCNC: 114 MG/DL (ref 70–110)
POTASSIUM SERPL-SCNC: 4.1 MMOL/L (ref 3.5–5.1)
PROT SERPL-MCNC: 7.6 G/DL (ref 6–8.4)
SODIUM SERPL-SCNC: 140 MMOL/L (ref 136–145)

## 2023-09-15 PROCEDURE — 93010 ELECTROCARDIOGRAM REPORT: CPT | Mod: ,,, | Performed by: INTERNAL MEDICINE

## 2023-09-15 PROCEDURE — 93005 ELECTROCARDIOGRAM TRACING: CPT

## 2023-09-15 PROCEDURE — 36415 COLL VENOUS BLD VENIPUNCTURE: CPT | Performed by: STUDENT IN AN ORGANIZED HEALTH CARE EDUCATION/TRAINING PROGRAM

## 2023-09-15 PROCEDURE — 93010 EKG 12-LEAD: ICD-10-PCS | Mod: ,,, | Performed by: INTERNAL MEDICINE

## 2023-09-15 PROCEDURE — 80053 COMPREHEN METABOLIC PANEL: CPT | Performed by: STUDENT IN AN ORGANIZED HEALTH CARE EDUCATION/TRAINING PROGRAM

## 2023-09-27 NOTE — H&P
[facility] H&P      Consult: Screening colonoscopy   Consulting Service: Jd Menjivar III, MD  Chief Complaint: constipation        HPI: Pt is a 68 y.o.male who presents for routine screening colonoscopy.  No personal or family history of colon cancer.  Last scope 10 years prior with recommendations for repeat in 10 years.  Recently experiencing constipation since starting PO iron.   Denies melena, rectal bleeding or pain, change in bowel habits or unintended weight loss.  Denies CP, SOB, abdominal pain, nausea, vomiting, fevers, or chills.  Known history of GERD with hiatal hernia controlled with medication and diet modifications.         PMH:   Past Medical History:   Diagnosis Date    Acute exacerbation of chronic bronchitis 2022    Arthralgia of knee     Atrial fibrillation     Calculus of gallbladder without cholecystitis without obstruction 2023    Edema     GERD (gastroesophageal reflux disease)     Heartburn     Hyperlipidemia     Insomnia     Iron deficiency anemia, unspecified     Malaise and fatigue     Moderate aortic stenosis     Orthostasis     Sleep apnea     NO CPAP    Unspecified chronic bronchitis     Unspecified sensorineural hearing loss     Wears dentures     FULL     PSH:   Past Surgical History:   Procedure Laterality Date    CARDIOVERSION N/A 2022    Procedure: Cardioversion;  Surgeon: William Angel MD;  Location: [facility];  Service: Cardiology;  Laterality: N/A;    COLONOSCOPY  2011    Dr. Chavez     Meds: See medication list;  Eliquis  ALL: Patient has no known allergies.  FHX: non contributory   SOC:   Social History     Socioeconomic History    Marital status:    Tobacco Use    Smoking status: Former     Current packs/day: 0.00     Types: Cigarettes     Quit date:      Years since quittin.7    Smokeless tobacco: Never   Substance and Sexual Activity    Alcohol use: Not Currently     Alcohol/week: 6.0 standard  drinks of alcohol     Types: 6 Cans of beer per week    Drug use: Never     ROS: Review of Systems   Constitutional:  Negative for chills, fever, malaise/fatigue and weight loss.   Respiratory:  Negative for cough.    Cardiovascular:  Negative for chest pain.   Gastrointestinal:  Positive for constipation. Negative for abdominal pain, blood in stool, diarrhea, heartburn, melena, nausea and vomiting.   All other systems reviewed and are negative.          Physical Exam:  /84 (BP Location: Right arm, Patient Position: Sitting, BP Method: Medium (Automatic))   Pulse 91   Temp 98.2 °F (36.8 °C)   Ht 6' (1.829 m)   Wt 101.1 kg (222 lb 14.2 oz)   SpO2 96%   BMI 30.23 kg/m²   Physical Exam  Constitutional:       General: He is not in acute distress.     Appearance: He is not ill-appearing or toxic-appearing.   Cardiovascular:      Rate and Rhythm: Normal rate and regular rhythm.   Pulmonary:      Effort: Pulmonary effort is normal. No respiratory distress.   Abdominal:      General: There is no distension.      Palpations: Abdomen is soft.      Tenderness: There is no abdominal tenderness. There is no guarding or rebound.   Skin:     General: Skin is warm and dry.      Capillary Refill: Capillary refill takes less than 2 seconds.   Neurological:      Mental Status: He is alert.               A/P: Pt is a 68 y.o.male who presents for routine screening colonoscopy  --To Endo on 9/21 for colonoscopy  --Procedure in detail explained to patient;  including risks including but not limited to bleeding, infection, damage to surrounding structures, and perforation- patient voiced understanding  --CBC, CMP, CXR, EKG   --Bowel prep given - Suprep  --CLD day before procedure  --Eliquis for Afib;  will need to stop 48hrs prior to procedure  -Cards clearance - Stanley Zuñiga MD  General Surgery   [phone]

## 2023-10-04 ENCOUNTER — PATIENT MESSAGE (OUTPATIENT)
Dept: ADMINISTRATIVE | Facility: OTHER | Age: 68
End: 2023-10-04
Payer: MEDICARE

## 2023-10-18 DIAGNOSIS — Z12.11 ENCOUNTER FOR SCREENING COLONOSCOPY FOR NON-HIGH-RISK PATIENT: Primary | ICD-10-CM

## 2023-10-18 DIAGNOSIS — Z12.12 ENCOUNTER FOR SCREENING FOR MALIGNANT NEOPLASM OF RECTUM: ICD-10-CM

## 2023-10-18 RX ORDER — SODIUM CHLORIDE 9 MG/ML
INJECTION, SOLUTION INTRAVENOUS CONTINUOUS
Status: CANCELLED | OUTPATIENT
Start: 2023-10-18

## 2023-10-18 RX ORDER — LIDOCAINE HYDROCHLORIDE 10 MG/ML
1 INJECTION, SOLUTION EPIDURAL; INFILTRATION; INTRACAUDAL; PERINEURAL ONCE AS NEEDED
Status: CANCELLED | OUTPATIENT
Start: 2023-10-18 | End: 2035-03-16

## 2023-10-18 RX ORDER — SODIUM CHLORIDE 0.9 % (FLUSH) 0.9 %
10 SYRINGE (ML) INJECTION
Status: CANCELLED | OUTPATIENT
Start: 2023-10-18

## 2023-10-30 ENCOUNTER — HOSPITAL ENCOUNTER (OUTPATIENT)
Dept: PREADMISSION TESTING | Facility: HOSPITAL | Age: 68
Discharge: HOME | End: 2023-10-30
Attending: SURGERY
Payer: MEDICARE

## 2023-10-30 VITALS — WEIGHT: 217 LBS | BODY MASS INDEX: 29.39 KG/M2 | HEIGHT: 72 IN

## 2023-11-02 ENCOUNTER — ANESTHESIA EVENT (OUTPATIENT)
Dept: ENDOSCOPY | Facility: HOSPITAL | Age: 68
End: 2023-11-02
Payer: MEDICARE

## 2023-11-02 NOTE — ANESTHESIA PREPROCEDURE EVALUATION
11/02/2023  Efe Kate is a 68 y.o., male.      Pre-op Assessment    I have reviewed the Patient Summary Reports.    I have reviewed the NPO Status.   I have reviewed the Medications.     Review of Systems  Anesthesia Hx:  No problems with previous Anesthesia  Denies Family Hx of Anesthesia complications.   Denies Personal Hx of Anesthesia complications.   Social:  Former Smoker    Cardiovascular:   Valvular problems/Murmurs, AS Dysrhythmias atrial fibrillation ECG has been reviewed.    Pulmonary:   COPD Sleep Apnea    Education provided regarding risk of obstructive sleep apnea     Renal/:  Renal/ Normal  CYST   Hepatic/GI:   Hiatal Hernia, GERD, well controlled    Neurological:  Neurology Normal    Endocrine:  Endocrine Normal      Lab Results   Component Value Date    WBC 7.53 09/15/2023    HGB 14.5 09/15/2023    HCT 42.8 09/15/2023    MCV 93 09/15/2023     09/15/2023     CMP  Sodium   Date Value Ref Range Status   09/15/2023 140 136 - 145 mmol/L Final     Potassium   Date Value Ref Range Status   09/15/2023 4.1 3.5 - 5.1 mmol/L Final     Chloride   Date Value Ref Range Status   09/15/2023 107 95 - 110 mmol/L Final     CO2   Date Value Ref Range Status   09/15/2023 31 (H) 23 - 29 mmol/L Final     Glucose   Date Value Ref Range Status   09/15/2023 114 (H) 70 - 110 mg/dL Final     BUN   Date Value Ref Range Status   09/15/2023 12 8 - 23 mg/dL Final     Creatinine   Date Value Ref Range Status   09/15/2023 1.2 0.5 - 1.4 mg/dL Final     Calcium   Date Value Ref Range Status   09/15/2023 9.0 8.7 - 10.5 mg/dL Final     Total Protein   Date Value Ref Range Status   09/15/2023 7.6 6.0 - 8.4 g/dL Final     Albumin   Date Value Ref Range Status   09/15/2023 3.8 3.5 - 5.2 g/dL Final     Total Bilirubin   Date Value Ref Range Status   09/15/2023 0.4 0.1 - 1.0 mg/dL Final     Comment:     For infants and  newborns, interpretation of results should be based  on gestational age, weight and in agreement with clinical  observations.    Premature Infant recommended reference ranges:  Up to 24 hours.............<8.0 mg/dL  Up to 48 hours............<12.0 mg/dL  3-5 days..................<15.0 mg/dL  6-29 days.................<15.0 mg/dL    For patients on Eltrombopag therapy, use of Dimension [place] TBIL is   not   recommended.       Alkaline Phosphatase   Date Value Ref Range Status   09/15/2023 69 55 - 135 U/L Final     AST   Date Value Ref Range Status   09/15/2023 20 10 - 40 U/L Final     ALT   Date Value Ref Range Status   09/15/2023 27 10 - 44 U/L Final     Anion Gap   Date Value Ref Range Status   09/15/2023 2 (L) 8 - 16 mmol/L Final     eGFR   Date Value Ref Range Status   09/15/2023 >60.0 >60 mL/min/1.73 m^2 Final       Physical Exam  General: Well nourished    Airway:  Mallampati: II / II  Mouth Opening: Normal  TM Distance: Normal  Tongue: Normal  Neck ROM: Normal ROM    Chest/Lungs:  Clear to auscultation    Heart:  Rhythm: Regular Rhythm, Frequent Prematures  Sounds: Normal  Murmur: Systolic;  Systolic: Grade II;        Anesthesia Plan  Type of Anesthesia, risks & benefits discussed:    Anesthesia Type: MAC  Intra-op Monitoring Plan: Standard ASA Monitors  Post Op Pain Control Plan: multimodal analgesia  Induction:  IV  Airway Plan: Direct  Informed Consent: Informed consent signed with the Patient and all parties understand the risks and agree with anesthesia plan.  All questions answered.   ASA Score: 4  Day of Surgery Review of History & Physical: I have interviewed and examined the patient. I have reviewed the patient's H&P dated: There are no significant changes.     Ready For Surgery From Anesthesia Perspective.     .

## 2023-11-03 NOTE — H&P
[facility]  General Surgery  History & Physical    Patient Name: Efe Kate  MRN: [id number]  Admission Date: (Not on file)  Attending Physician: Carl Chavez MD   Primary Care Provider: Jd Menjivar III, MD    Patient information was obtained from patient and past medical records.     Subjective:     Chief Complaint/Reason for Admission: Screening colonoscopy    History of Present Illness:  Patient is a 68 y.o. male Who is now being admitted for a screening colonoscopy. The last one he had was 10 years ago and it was normal. The patient denies any symptoms and has no family history of colon cancer.    No current facility-administered medications on file prior to encounter.     Current Outpatient Medications on File Prior to Encounter   Medication Sig    acetaminophen (TYLENOL) 650 MG TbSR Take 650 mg by mouth daily as needed.    apixaban (ELIQUIS) 5 mg Tab Take 5 mg by mouth 2 (two) times daily. Last dose 11/2/23    metoprolol tartrate (LOPRESSOR) 25 MG tablet Take 0.5 tablets (12.5 mg total) by mouth 2 (two) times daily.    omeprazole (PRILOSEC OTC) 20 MG tablet     rosuvastatin (CRESTOR) 5 MG tablet Take 1 tablet (5 mg total) by mouth once daily.    tadalafiL (CIALIS) 5 MG tablet Take 1 tablet (5 mg total) by mouth once daily.    tamsulosin (FLOMAX) 0.4 mg Cap Take 2 capsules (0.8 mg total) by mouth once daily.       Review of patient's allergies indicates:  No Known Allergies    Past Medical History:   Diagnosis Date    Acute exacerbation of chronic bronchitis 04/11/2022    Arthralgia of knee     Atrial fibrillation     Calculus of gallbladder without cholecystitis without obstruction 03/30/2023    Edema     GERD (gastroesophageal reflux disease)     Heartburn     Hyperlipidemia     Insomnia     Iron deficiency anemia, unspecified     Malaise and fatigue     Moderate aortic stenosis     Orthostasis     Sleep apnea     NO CPAP    Unspecified chronic bronchitis     Unspecified sensorineural  hearing loss     Wears dentures     FULL     Past Surgical History:   Procedure Laterality Date    CARDIOVERSION N/A 2022    Procedure: Cardioversion;  Surgeon: William Angel MD;  Location: [facility];  Service: Cardiology;  Laterality: N/A;    COLONOSCOPY  2011    Dr. Chavez    LAPAROSCOPIC CHOLECYSTECTOMY       Family History       Problem Relation (Age of Onset)    Brain cancer Sister, Brother    Cancer Father, Brother    Diabetes Mother    Heart murmur Mother    No Known Problems Sister, Brother, Brother, Brother    Tuberculosis Father          Tobacco Use    Smoking status: Former     Current packs/day: 0.00     Types: Cigarettes     Quit date:      Years since quittin.8    Smokeless tobacco: Never   Substance and Sexual Activity    Alcohol use: Not Currently     Alcohol/week: 6.0 standard drinks of alcohol     Types: 6 Cans of beer per week    Drug use: Never    Sexual activity: Not on file     Comment:      Review of Systems   Cardiovascular:         Has a past history of paroxysmal atrial fibrillation   Gastrointestinal:  Negative for abdominal distention, abdominal pain and anal bleeding.   Hematological:         The patient is on chronic anticoagulation treatment for his PAF   All other systems reviewed and are negative.    Objective:     Vital Signs (Most Recent):    Vital Signs (24h Range):           There is no height or weight on file to calculate BMI.    Physical Exam  Constitutional:       Appearance: Normal appearance.   HENT:      Head: Normocephalic.      Nose: Nose normal.      Mouth/Throat:      Mouth: Mucous membranes are moist.   Eyes:      Extraocular Movements: Extraocular movements intact.   Cardiovascular:      Rate and Rhythm: Normal rate and regular rhythm.   Pulmonary:      Breath sounds: Normal breath sounds.   Abdominal:      General: Abdomen is flat. Bowel sounds are normal. There is no distension.      Palpations: Abdomen is soft. There is no mass.       Tenderness: There is no abdominal tenderness.   Musculoskeletal:         General: Normal range of motion.      Cervical back: Neck supple.   Lymphadenopathy:      Cervical: No cervical adenopathy.   Skin:     General: Skin is warm and dry.      Coloration: Skin is not jaundiced.   Neurological:      General: No focal deficit present.      Mental Status: He is alert and oriented to person, place, and time.   Psychiatric:         Mood and Affect: Mood normal.         Behavior: Behavior normal.         Significant Labs:  I have reviewed all pertinent lab results within the past 24 hours.    Significant Diagnostics:  I have reviewed all pertinent imaging results/findings within the past 24 hours.    Assessment/Plan:     Active Diagnoses:    Diagnosis Date Noted POA    PRINCIPAL PROBLEM:  Encounter for screening colonoscopy for non-high-risk patient [Z12.11] 10/18/2023 Not Applicable      Problems Resolved During this Admission:     VTE Risk Mitigation (From admission, onward)      None            Carl Chavez MD  General Surgery  [place] - Endoscopy

## 2023-11-06 ENCOUNTER — HOSPITAL ENCOUNTER (OUTPATIENT)
Facility: HOSPITAL | Age: 68
Discharge: HOME | End: 2023-11-06
Attending: SURGERY | Admitting: SURGERY
Payer: MEDICARE

## 2023-11-06 ENCOUNTER — ANESTHESIA (OUTPATIENT)
Dept: ENDOSCOPY | Facility: HOSPITAL | Age: 68
End: 2023-11-06
Payer: MEDICARE

## 2023-11-06 VITALS
TEMPERATURE: 99 F | DIASTOLIC BLOOD PRESSURE: 74 MMHG | RESPIRATION RATE: 14 BRPM | OXYGEN SATURATION: 90 % | HEART RATE: 83 BPM | SYSTOLIC BLOOD PRESSURE: 91 MMHG

## 2023-11-06 DIAGNOSIS — K63.5 CECAL POLYP: ICD-10-CM

## 2023-11-06 DIAGNOSIS — Z12.11 ENCOUNTER FOR SCREENING COLONOSCOPY FOR NON-HIGH-RISK PATIENT: Primary | ICD-10-CM

## 2023-11-06 DIAGNOSIS — K57.30 SIGMOID DIVERTICULOSIS: ICD-10-CM

## 2023-11-06 PROCEDURE — 27201423 OPTIME MED/SURG SUP & DEVICES STERILE SUPPLY: Performed by: SURGERY

## 2023-11-06 PROCEDURE — 37000008 HC ANESTHESIA 1ST 15 MINUTES: Performed by: SURGERY

## 2023-11-06 PROCEDURE — 25000003 PHARM REV CODE 250: Performed by: SURGERY

## 2023-11-06 PROCEDURE — 37000009 HC ANESTHESIA EA ADD 15 MINS: Performed by: SURGERY

## 2023-11-06 PROCEDURE — 45380 COLONOSCOPY AND BIOPSY: CPT | Performed by: SURGERY

## 2023-11-06 PROCEDURE — 88305 TISSUE EXAM BY PATHOLOGIST: CPT | Mod: TC | Performed by: ANESTHESIOLOGY

## 2023-11-06 RX ORDER — SODIUM CHLORIDE 0.9 % (FLUSH) 0.9 %
10 SYRINGE (ML) INJECTION
Status: DISCONTINUED | OUTPATIENT
Start: 2023-11-06 | End: 2023-11-06 | Stop reason: HOSPADM

## 2023-11-06 RX ORDER — LIDOCAINE HYDROCHLORIDE 10 MG/ML
1 INJECTION, SOLUTION EPIDURAL; INFILTRATION; INTRACAUDAL; PERINEURAL ONCE AS NEEDED
Status: DISCONTINUED | OUTPATIENT
Start: 2023-11-06 | End: 2023-11-06 | Stop reason: HOSPADM

## 2023-11-06 RX ORDER — PROPOFOL 10 MG/ML
VIAL (ML) INTRAVENOUS
Status: DISCONTINUED | OUTPATIENT
Start: 2023-11-06 | End: 2023-11-06

## 2023-11-06 RX ORDER — SODIUM CHLORIDE 9 MG/ML
INJECTION, SOLUTION INTRAVENOUS CONTINUOUS
Status: DISCONTINUED | OUTPATIENT
Start: 2023-11-06 | End: 2023-11-06 | Stop reason: HOSPADM

## 2023-11-06 RX ADMIN — Medication 50 MG: at 08:11

## 2023-11-06 RX ADMIN — Medication 100 MG: at 08:11

## 2023-11-06 RX ADMIN — SODIUM CHLORIDE: 9 INJECTION, SOLUTION INTRAVENOUS at 06:11

## 2023-11-06 NOTE — DISCHARGE SUMMARY
[place] - Endoscopy  Discharge Note  Short Stay    Procedure(s) (LRB):  COLONOSCOPY, WITH POLYPECTOMY USING COLD BIOPSY FORCEPS (N/A)      OUTCOME: Patient tolerated treatment/procedure well without complication and is now ready for discharge.    DISPOSITION: Home or Self Care    FINAL DIAGNOSIS:  1. Encounter for screening colonoscopy for non-high-risk patient 2. Cecal polyp 3.Sigmoid diverticulosis    FOLLOWUP:  Patient is to follow up in the office in 1 week for pathology report.  He is also to start his Eliquis tomorrow    DISCHARGE INSTRUCTIONS:    Discharge Procedure Orders   Diet Adult Regular   Order Comments: High-fiber diet     Notify your health care provider if you experience any of the following:  temperature >100.4     Notify your health care provider if you experience any of the following:  persistent nausea and vomiting or diarrhea     Notify your health care provider if you experience any of the following:  severe uncontrolled pain     Notify your health care provider if you experience any of the following:   Order Comments: Severe rectal bleeding     Activity as tolerated        TIME SPENT ON DISCHARGE:  15 minutes

## 2023-11-06 NOTE — ANESTHESIA POSTPROCEDURE EVALUATION
Anesthesia Post Evaluation    Patient: Efe Kate    Procedure(s) Performed: Procedure(s) (LRB):  COLONOSCOPY, WITH POLYPECTOMY USING COLD BIOPSY FORCEPS (N/A)    Final Anesthesia Type: MAC      Patient location during evaluation: OPS  Patient participation: Yes- Able to Participate  Level of consciousness: awake  Post-procedure vital signs: reviewed and stable  Pain management: adequate  Airway patency: patent    PONV status at discharge: No PONV  Anesthetic complications: no      Cardiovascular status: blood pressure returned to baseline  Respiratory status: spontaneous ventilation  Hydration status: euvolemic  Follow-up not needed.          Vitals Value Taken Time   BP 91/74 11/06/23 0917   Temp 37.2 °C (98.9 °F) 11/06/23 0917   Pulse 83 11/06/23 0917   Resp 14 11/06/23 0917   SpO2 90 % 11/06/23 0917         No case tracking events are documented in the log.      Pain/Elayne Score: Elayne Score: 10 (11/6/2023  9:22 AM)

## 2023-11-06 NOTE — DISCHARGE INSTRUCTIONS
NO DRIVING OR ALCOHOL 24 HOURS AFTER PROCEDURE     FOLLOW UP WITH DR BOWMAN ON NOV 14     RESTART MARIANA TOMORROW     NOTIFY HIM OR ANY PROBLEM OR CONCERNS

## 2023-11-06 NOTE — TRANSFER OF CARE
Anesthesia Transfer of Care Note    Patient: Efe Kate    Procedure(s) Performed: Procedure(s) (LRB):  COLONOSCOPY, WITH POLYPECTOMY USING COLD BIOPSY FORCEPS (N/A)    Patient location: GI    Anesthesia Type: MAC    Transport from OR: Transported from OR on room air with adequate spontaneous ventilation    Post pain: adequate analgesia    Post assessment: no apparent anesthetic complications    Post vital signs: stable    Level of consciousness: awake, alert and oriented    Nausea/Vomiting: no nausea/vomiting    Complications: none    Transfer of care protocol was followed      Last vitals:   BP 95/75    RR 16  O2 Sat 98%

## 2023-11-06 NOTE — OP NOTE
[facility] - Endoscopy  Colonoscopy Procedure  Operative Note    SUMMARY     Date of Procedure: 11/6/2023     Procedure: Procedure(s) (LRB):  COLONOSCOPY, WITH POLYPECTOMY USING COLD BIOPSY FORCEPS (N/A)    Surgeon(s) and Role:     * aCrl Chavez MD - Primary    Assisting Surgeon: None     Patient location: PACU    Pre-Operative Diagnosis: Encounter for screening colonoscopy for non-high-risk patient [Z12.11]    Post-Operative Diagnosis: Post-Op Diagnosis Codes:     * Encounter for screening colonoscopy for non-high-risk patient [Z12.11]     * Cecal polyp [K63.5]     * Sigmoid diverticulosis [K57.30]     Indications:  Screening colonoscopy     Anesthesia:  Mac        Procedure:                  The patient was brought in to the endoscopy suite where the risks, benefits, and alternatives of the procedure were described.  The patient was given the opportunity to ask questions and then signed informed consent.  Patient was positioned in the left lateral decubitus position, continuous monitoring was initiated, and supplemental oxygen was provided via nasal cannula.  Adequate sedation was achieved with the above mentioned medications and then titrated during the entire procedure.  Digital rectal exam was performed.  Under direct visualization the colonoscope was introduced through the anus in to the rectum.  The scope was then advanced to the terminal ileum.  Scope was then withdrawn and the mucosa was carefully examined in a circular fashion.  The entire colonic mucosa was examined, including the rectum with retroflexion.  Air was evacuated from the colon and the procedure was terminated.  The patient tolerated the procedure well and was able to be transferred to the recovery area in stable condition.    Findings:                 Digital rectal examination:  Rectal exam showed normal sphincter tone with no masses palpated.  There was no blood on the glove.                      Rectum:  Rectal mucosa appeared  unremarkable                    Sigmoid:  Sigmoid colon had multiple diverticula        Descending:  Descending colon showed no abnormalities         Transverse:  Transverse colon was normal         Ascending:  Ascending colon showed no abnormalities        Cecum:  In the cecum, a small polyp was found and removed via cold biopsy forceps.  The ileocecal valve and appendiceal orifice were normal.  We entered into the terminal ileum.        Terminal Ileum:  Terminal ileum mucosa was normal     Specimens:   Specimens (From admission, onward)       Start     Ordered    11/06/23 0845  Specimen to Pathology, Surgery Gastrointestinal tract  Once        Comments: Pre-op Diagnosis: Encounter for screening colonoscopy for non-high-risk patient [Z12.11]Procedure(s):COLONOSCOPY, WITH POLYPECTOMY USING COLD BIOPSY FORCEPS Number of specimens: 1Name of specimens: Cecal Polyp @ 0842     References:    Click here for ordering Quick Tip   Question Answer Comment   Procedure Type: Gastrointestinal tract    Which provider would you like to cc? CARL CHAVEZ    Release to patient Immediate        11/06/23 0853                      Estimated Blood Loss (EBL): * No values recorded between 11/6/2023  8:16 AM and 11/6/2023  8:56 AM *     Complications: No     Diagnostic Impression:  1.  Screening colonoscopy 2. Cecal polyp 3. Sigmoid diverticulosis      Recommendations: Discharge patient to home.    Disposition: PACU - hemodynamically stable.     Attestation: I performed the procedure.        Follow Up:             Future Appointments   Date Time Provider Department Center   3/1/2024  9:15 AM Jd Menjivar III, MD [facility]           Carl Chavez MD  11/6/2023

## 2023-11-08 LAB — SPECIMEN TO PATHOLOGY - SURGICAL: NORMAL

## 2024-03-01 ENCOUNTER — LAB VISIT (OUTPATIENT)
Dept: LAB | Facility: HOSPITAL | Age: 69
End: 2024-03-01
Attending: INTERNAL MEDICINE
Payer: MEDICARE

## 2024-03-01 DIAGNOSIS — D50.8 OTHER IRON DEFICIENCY ANEMIA: ICD-10-CM

## 2024-03-01 LAB
ALBUMIN SERPL BCP-MCNC: 3.6 G/DL (ref 3.5–5.2)
ALP SERPL-CCNC: 65 U/L (ref 55–135)
ALT SERPL W/O P-5'-P-CCNC: 32 U/L (ref 10–44)
ANION GAP SERPL CALC-SCNC: 4 MMOL/L (ref 3–11)
AST SERPL-CCNC: 25 U/L (ref 10–40)
BASOPHILS # BLD AUTO: 0.09 K/UL (ref 0–0.2)
BASOPHILS NFR BLD: 1.1 % (ref 0–1.9)
BILIRUB SERPL-MCNC: 0.4 MG/DL (ref 0.1–1)
BUN SERPL-MCNC: 21 MG/DL (ref 8–23)
CALCIUM SERPL-MCNC: 9.1 MG/DL (ref 8.7–10.5)
CHLORIDE SERPL-SCNC: 104 MMOL/L (ref 95–110)
CO2 SERPL-SCNC: 28 MMOL/L (ref 23–29)
CREAT SERPL-MCNC: 1.2 MG/DL (ref 0.5–1.4)
DIFFERENTIAL METHOD BLD: ABNORMAL
EOSINOPHIL # BLD AUTO: 1.2 K/UL (ref 0–0.5)
EOSINOPHIL NFR BLD: 14.4 % (ref 0–8)
ERYTHROCYTE [DISTWIDTH] IN BLOOD BY AUTOMATED COUNT: 13.2 % (ref 11.5–14.5)
EST. GFR  (NO RACE VARIABLE): >60 ML/MIN/1.73 M^2
FERRITIN SERPL-MCNC: 81 NG/ML (ref 20–300)
GLUCOSE SERPL-MCNC: 95 MG/DL (ref 70–110)
HCT VFR BLD AUTO: 42.7 % (ref 40–54)
HGB BLD-MCNC: 14.1 G/DL (ref 14–18)
IMM GRANULOCYTES # BLD AUTO: 0.02 K/UL (ref 0–0.04)
IMM GRANULOCYTES NFR BLD AUTO: 0.3 % (ref 0–0.5)
LYMPHOCYTES # BLD AUTO: 2.3 K/UL (ref 1–4.8)
LYMPHOCYTES NFR BLD: 28.3 % (ref 18–48)
MCH RBC QN AUTO: 31 PG (ref 27–31)
MCHC RBC AUTO-ENTMCNC: 33 G/DL (ref 32–36)
MCV RBC AUTO: 94 FL (ref 82–98)
MONOCYTES # BLD AUTO: 0.9 K/UL (ref 0.3–1)
MONOCYTES NFR BLD: 11 % (ref 4–15)
NEUTROPHILS # BLD AUTO: 3.6 K/UL (ref 1.8–7.7)
NEUTROPHILS NFR BLD: 44.9 % (ref 38–73)
NRBC BLD-RTO: 0 /100 WBC
PLATELET # BLD AUTO: 290 K/UL (ref 150–450)
PMV BLD AUTO: 10 FL (ref 9.2–12.9)
POTASSIUM SERPL-SCNC: 4.6 MMOL/L (ref 3.5–5.1)
PROT SERPL-MCNC: 7.8 G/DL (ref 6–8.4)
RBC # BLD AUTO: 4.55 M/UL (ref 4.6–6.2)
SODIUM SERPL-SCNC: 136 MMOL/L (ref 136–145)
WBC # BLD AUTO: 8 K/UL (ref 3.9–12.7)

## 2024-03-01 PROCEDURE — 85025 COMPLETE CBC W/AUTO DIFF WBC: CPT | Performed by: INTERNAL MEDICINE

## 2024-03-01 PROCEDURE — 36415 COLL VENOUS BLD VENIPUNCTURE: CPT | Performed by: INTERNAL MEDICINE

## 2024-03-01 PROCEDURE — 80053 COMPREHEN METABOLIC PANEL: CPT | Performed by: INTERNAL MEDICINE

## 2024-03-01 PROCEDURE — 82728 ASSAY OF FERRITIN: CPT | Performed by: INTERNAL MEDICINE

## 2024-09-18 PROBLEM — J44.9 CHRONIC OBSTRUCTIVE PULMONARY DISEASE: Status: RESOLVED | Noted: 2022-04-11 | Resolved: 2024-09-18

## 2024-09-18 PROBLEM — K59.00 CONSTIPATION: Status: ACTIVE | Noted: 2024-09-18

## 2024-09-23 ENCOUNTER — LAB VISIT (OUTPATIENT)
Dept: LAB | Facility: HOSPITAL | Age: 69
End: 2024-09-23
Attending: INTERNAL MEDICINE
Payer: MEDICARE

## 2024-09-23 DIAGNOSIS — I48.0 PAROXYSMAL ATRIAL FIBRILLATION: ICD-10-CM

## 2024-09-23 DIAGNOSIS — Z12.5 PROSTATE CANCER SCREENING: ICD-10-CM

## 2024-09-23 DIAGNOSIS — Z13.6 SCREENING FOR CARDIOVASCULAR CONDITION: ICD-10-CM

## 2024-09-23 LAB
ALBUMIN SERPL BCP-MCNC: 3.4 G/DL (ref 3.5–5.2)
ALP SERPL-CCNC: 68 U/L (ref 55–135)
ALT SERPL W/O P-5'-P-CCNC: 28 U/L (ref 10–44)
ANION GAP SERPL CALC-SCNC: 8 MMOL/L (ref 3–11)
AST SERPL-CCNC: 23 U/L (ref 10–40)
BASOPHILS # BLD AUTO: 0.05 K/UL (ref 0–0.2)
BASOPHILS NFR BLD: 0.7 % (ref 0–1.9)
BILIRUB SERPL-MCNC: 0.4 MG/DL (ref 0.1–1)
BUN SERPL-MCNC: 11 MG/DL (ref 8–23)
CALCIUM SERPL-MCNC: 8.9 MG/DL (ref 8.7–10.5)
CHLORIDE SERPL-SCNC: 102 MMOL/L (ref 95–110)
CHOLEST SERPL-MCNC: 123 MG/DL (ref 120–199)
CHOLEST/HDLC SERPL: 3.2 {RATIO} (ref 2–5)
CO2 SERPL-SCNC: 28 MMOL/L (ref 23–29)
COMPLEXED PSA SERPL-MCNC: 0.09 NG/ML (ref 0–4)
CREAT SERPL-MCNC: 1.2 MG/DL (ref 0.5–1.4)
DIFFERENTIAL METHOD BLD: ABNORMAL
EOSINOPHIL # BLD AUTO: 0.7 K/UL (ref 0–0.5)
EOSINOPHIL NFR BLD: 9.3 % (ref 0–8)
ERYTHROCYTE [DISTWIDTH] IN BLOOD BY AUTOMATED COUNT: 14 % (ref 11.5–14.5)
EST. GFR  (NO RACE VARIABLE): >60 ML/MIN/1.73 M^2
GLUCOSE SERPL-MCNC: 98 MG/DL (ref 70–110)
HCT VFR BLD AUTO: 38.4 % (ref 40–54)
HDLC SERPL-MCNC: 39 MG/DL (ref 40–75)
HDLC SERPL: 31.7 % (ref 20–50)
HGB BLD-MCNC: 12.9 G/DL (ref 14–18)
IMM GRANULOCYTES # BLD AUTO: 0.01 K/UL (ref 0–0.04)
IMM GRANULOCYTES NFR BLD AUTO: 0.1 % (ref 0–0.5)
LDLC SERPL CALC-MCNC: 65.2 MG/DL (ref 63–159)
LYMPHOCYTES # BLD AUTO: 2.5 K/UL (ref 1–4.8)
LYMPHOCYTES NFR BLD: 35 % (ref 18–48)
MCH RBC QN AUTO: 30.9 PG (ref 27–31)
MCHC RBC AUTO-ENTMCNC: 33.6 G/DL (ref 32–36)
MCV RBC AUTO: 92 FL (ref 82–98)
MONOCYTES # BLD AUTO: 0.8 K/UL (ref 0.3–1)
MONOCYTES NFR BLD: 12 % (ref 4–15)
NEUTROPHILS # BLD AUTO: 3 K/UL (ref 1.8–7.7)
NEUTROPHILS NFR BLD: 42.9 % (ref 38–73)
NONHDLC SERPL-MCNC: 84 MG/DL
NRBC BLD-RTO: 0 /100 WBC
PLATELET # BLD AUTO: 256 K/UL (ref 150–450)
PMV BLD AUTO: 10.4 FL (ref 9.2–12.9)
POTASSIUM SERPL-SCNC: 3.9 MMOL/L (ref 3.5–5.1)
PROT SERPL-MCNC: 7.4 G/DL (ref 6–8.4)
RBC # BLD AUTO: 4.17 M/UL (ref 4.6–6.2)
SODIUM SERPL-SCNC: 138 MMOL/L (ref 136–145)
TRIGL SERPL-MCNC: 94 MG/DL (ref 30–150)
TSH SERPL DL<=0.005 MIU/L-ACNC: 1.73 UIU/ML (ref 0.4–4)
WBC # BLD AUTO: 7.02 K/UL (ref 3.9–12.7)

## 2024-09-23 PROCEDURE — 80053 COMPREHEN METABOLIC PANEL: CPT | Performed by: INTERNAL MEDICINE

## 2024-09-23 PROCEDURE — 80061 LIPID PANEL: CPT | Performed by: INTERNAL MEDICINE

## 2024-09-23 PROCEDURE — 36415 COLL VENOUS BLD VENIPUNCTURE: CPT | Performed by: INTERNAL MEDICINE

## 2024-09-23 PROCEDURE — 84443 ASSAY THYROID STIM HORMONE: CPT | Performed by: INTERNAL MEDICINE

## 2024-09-23 PROCEDURE — 84153 ASSAY OF PSA TOTAL: CPT | Performed by: INTERNAL MEDICINE

## 2024-09-23 PROCEDURE — 85025 COMPLETE CBC W/AUTO DIFF WBC: CPT | Performed by: INTERNAL MEDICINE

## 2024-10-31 ENCOUNTER — HOSPITAL ENCOUNTER (OUTPATIENT)
Dept: PULMONOLOGY | Facility: HOSPITAL | Age: 69
Discharge: HOME | End: 2024-10-31
Attending: INTERNAL MEDICINE
Payer: MEDICARE

## 2024-10-31 DIAGNOSIS — I48.0 PAF (PAROXYSMAL ATRIAL FIBRILLATION): ICD-10-CM

## 2024-10-31 DIAGNOSIS — I35.0 SEVERE AORTIC STENOSIS: ICD-10-CM

## 2024-10-31 DIAGNOSIS — I10 HTN (HYPERTENSION): ICD-10-CM

## 2024-10-31 LAB
DLCO SINGLE BREATH LLN: 21.94
DLCO SINGLE BREATH PRE REF: 70.8 %
DLCO SINGLE BREATH REF: 28.87
DLCOC SBVA LLN: 2.74
DLCOC SBVA REF: 3.83
DLCOC SINGLE BREATH LLN: 21.94
DLCOC SINGLE BREATH REF: 28.87
DLCOVA LLN: 2.74
DLCOVA PRE REF: 67.4 %
DLCOVA PRE: 2.58 ML/(MIN*MMHG*L) (ref 2.74–4.93)
DLCOVA REF: 3.83
ERVN2 LLN: -16448.89
ERVN2 PRE REF: 72.5 %
ERVN2 PRE: 0.81 L (ref -16448.89–16451.11)
ERVN2 REF: 1.11
FEF 25 75 LLN: 1.53
FEF 25 75 PRE REF: 45.6 %
FEF 25 75 REF: 3.24
FEV1 FVC LLN: 62
FEV1 FVC PRE REF: 91.7 %
FEV1 FVC REF: 76
FEV1 LLN: 2.45
FEV1 PRE REF: 89.5 %
FEV1 REF: 3.4
FRCN2 LLN: 2.83
FRCN2 PRE REF: 149.6 %
FRCN2 REF: 3.82
FVC LLN: 3.35
FVC PRE REF: 97.1 %
FVC REF: 4.53
IVC PRE: 3.72 L (ref 3.35–5.72)
IVC SINGLE BREATH LLN: 3.35
IVC SINGLE BREATH PRE REF: 82.2 %
IVC SINGLE BREATH REF: 4.53
MVV LLN: 114
MVV PRE REF: 51.4 %
MVV REF: 134
PEF LLN: 6.37
PEF PRE REF: 96.3 %
PEF REF: 8.82
PRE DLCO: 20.44 ML/(MIN*MMHG) (ref 21.94–35.8)
PRE FEF 25 75: 1.48 L/S (ref 1.53–4.95)
PRE FET 100: 9.43 SEC
PRE FEV1 FVC: 69.26 % (ref 62.16–87.44)
PRE FEV1: 3.05 L (ref 2.45–4.29)
PRE FRC N2: 5.72 L (ref 2.83–4.81)
PRE FVC: 4.4 L (ref 3.35–5.72)
PRE MVV: 68.69 L/MIN (ref 113.69–153.82)
PRE PEF: 8.49 L/S (ref 6.37–11.27)
RVN2 LLN: 2.03
RVN2 PRE REF: 181.4 %
RVN2 PRE: 4.91 L (ref 2.03–3.38)
RVN2 REF: 2.71
RVN2TLCN2 LLN: 32.28
RVN2TLCN2 PRE REF: 127.8 %
RVN2TLCN2 PRE: 52.74 % (ref 32.28–50.24)
RVN2TLCN2 REF: 41.26
TLCN2 LLN: 6.38
TLCN2 PRE REF: 123.6 %
TLCN2 PRE: 9.31 L (ref 6.38–8.68)
TLCN2 REF: 7.53
VA PRE: 7.92 L (ref 7.38–7.38)
VA SINGLE BREATH LLN: 7.38
VA SINGLE BREATH PRE REF: 107.2 %
VA SINGLE BREATH REF: 7.38
VCMAXN2 LLN: 3.35
VCMAXN2 PRE REF: 97.1 %
VCMAXN2 PRE: 4.4 L (ref 3.35–5.72)
VCMAXN2 REF: 4.53

## 2024-10-31 PROCEDURE — 94727 GAS DIL/WSHOT DETER LNG VOL: CPT

## 2024-10-31 PROCEDURE — 94010 BREATHING CAPACITY TEST: CPT

## 2024-10-31 PROCEDURE — 94729 DIFFUSING CAPACITY: CPT

## 2024-11-19 PROBLEM — I50.32 CHRONIC DIASTOLIC HF (HEART FAILURE), NYHA CLASS 2: Status: ACTIVE | Noted: 2024-11-19

## 2024-11-19 PROBLEM — I25.10 CAD (CORONARY ARTERY DISEASE), NATIVE CORONARY ARTERY: Status: ACTIVE | Noted: 2024-11-19

## 2025-02-12 PROBLEM — K92.0 COFFEE GROUND EMESIS: Status: ACTIVE | Noted: 2025-02-12

## 2025-05-02 PROBLEM — I48.19 PERSISTENT ATRIAL FIBRILLATION: Status: ACTIVE | Noted: 2022-06-03

## 2025-05-18 ENCOUNTER — HOSPITAL ENCOUNTER (EMERGENCY)
Facility: HOSPITAL | Age: 70
Discharge: HOME | End: 2025-05-18
Attending: EMERGENCY MEDICINE
Payer: MEDICARE

## 2025-05-18 VITALS
RESPIRATION RATE: 19 BRPM | BODY MASS INDEX: 29.42 KG/M2 | HEART RATE: 71 BPM | SYSTOLIC BLOOD PRESSURE: 111 MMHG | HEIGHT: 73 IN | OXYGEN SATURATION: 97 % | TEMPERATURE: 98 F | WEIGHT: 222 LBS | DIASTOLIC BLOOD PRESSURE: 61 MMHG

## 2025-05-18 DIAGNOSIS — R14.2 BELCHING: Primary | ICD-10-CM

## 2025-05-18 DIAGNOSIS — E86.0 DEHYDRATION: ICD-10-CM

## 2025-05-18 LAB
ABSOLUTE EOSINOPHIL (OHS): 0.17 K/UL
ABSOLUTE MONOCYTE (OHS): 1.24 K/UL (ref 0.3–1)
ABSOLUTE NEUTROPHIL COUNT (OHS): 6.52 K/UL (ref 1.8–7.7)
ALBUMIN SERPL BCP-MCNC: 3.6 G/DL (ref 3.5–5.2)
ALP SERPL-CCNC: 66 UNIT/L (ref 40–150)
ALT SERPL W/O P-5'-P-CCNC: 28 UNIT/L (ref 10–44)
ANION GAP (OHS): 11 MMOL/L (ref 8–16)
AST SERPL-CCNC: 36 UNIT/L (ref 11–45)
BASOPHILS # BLD AUTO: 0.06 K/UL
BASOPHILS NFR BLD AUTO: 0.6 %
BILIRUB SERPL-MCNC: 0.4 MG/DL (ref 0.1–1)
BUN SERPL-MCNC: 47 MG/DL (ref 8–23)
CALCIUM SERPL-MCNC: 8.9 MG/DL (ref 8.7–10.5)
CHLORIDE SERPL-SCNC: 106 MMOL/L (ref 95–110)
CO2 SERPL-SCNC: 23 MMOL/L (ref 23–29)
CREAT SERPL-MCNC: 1.2 MG/DL (ref 0.5–1.4)
ERYTHROCYTE [DISTWIDTH] IN BLOOD BY AUTOMATED COUNT: 14.6 % (ref 11.5–14.5)
GFR SERPLBLD CREATININE-BSD FMLA CKD-EPI: >60 ML/MIN/1.73/M2
GLUCOSE SERPL-MCNC: 124 MG/DL (ref 70–110)
HCT VFR BLD AUTO: 37.7 % (ref 40–54)
HGB BLD-MCNC: 12 GM/DL (ref 14–18)
HOLD SPECIMEN: NORMAL
IMM GRANULOCYTES # BLD AUTO: 0.03 K/UL (ref 0–0.04)
IMM GRANULOCYTES NFR BLD AUTO: 0.3 % (ref 0–0.5)
LYMPHOCYTES # BLD AUTO: 2.55 K/UL (ref 1–4.8)
MCH RBC QN AUTO: 29.5 PG (ref 27–31)
MCHC RBC AUTO-ENTMCNC: 31.8 G/DL (ref 32–36)
MCV RBC AUTO: 93 FL (ref 82–98)
NUCLEATED RBC (/100WBC) (OHS): 0 /100 WBC
PLATELET # BLD AUTO: 235 K/UL (ref 150–450)
PMV BLD AUTO: 11.3 FL (ref 9.2–12.9)
POTASSIUM SERPL-SCNC: 4.5 MMOL/L (ref 3.5–5.1)
PROT SERPL-MCNC: 7.4 GM/DL (ref 6–8.4)
RBC # BLD AUTO: 4.07 M/UL (ref 4.6–6.2)
RELATIVE EOSINOPHIL (OHS): 1.6 %
RELATIVE LYMPHOCYTE (OHS): 24.1 % (ref 18–48)
RELATIVE MONOCYTE (OHS): 11.7 % (ref 4–15)
RELATIVE NEUTROPHIL (OHS): 61.7 % (ref 38–73)
SODIUM SERPL-SCNC: 140 MMOL/L (ref 136–145)
TROPONIN I SERPL HS-MCNC: 3 NG/L
WBC # BLD AUTO: 10.57 K/UL (ref 3.9–12.7)

## 2025-05-18 PROCEDURE — 36415 COLL VENOUS BLD VENIPUNCTURE: CPT | Performed by: EMERGENCY MEDICINE

## 2025-05-18 PROCEDURE — 96374 THER/PROPH/DIAG INJ IV PUSH: CPT

## 2025-05-18 PROCEDURE — 99284 EMERGENCY DEPT VISIT MOD MDM: CPT | Mod: 25

## 2025-05-18 PROCEDURE — 96361 HYDRATE IV INFUSION ADD-ON: CPT

## 2025-05-18 PROCEDURE — 25000003 PHARM REV CODE 250: Performed by: EMERGENCY MEDICINE

## 2025-05-18 PROCEDURE — 84484 ASSAY OF TROPONIN QUANT: CPT | Performed by: EMERGENCY MEDICINE

## 2025-05-18 PROCEDURE — 80053 COMPREHEN METABOLIC PANEL: CPT | Performed by: EMERGENCY MEDICINE

## 2025-05-18 PROCEDURE — 63600175 PHARM REV CODE 636 W HCPCS: Performed by: EMERGENCY MEDICINE

## 2025-05-18 PROCEDURE — 85025 COMPLETE CBC W/AUTO DIFF WBC: CPT | Performed by: EMERGENCY MEDICINE

## 2025-05-18 RX ORDER — ALUMINUM HYDROXIDE, MAGNESIUM HYDROXIDE, AND SIMETHICONE 1200; 120; 1200 MG/30ML; MG/30ML; MG/30ML
30 SUSPENSION ORAL ONCE
Status: COMPLETED | OUTPATIENT
Start: 2025-05-18 | End: 2025-05-18

## 2025-05-18 RX ORDER — HYOSCYAMINE SULFATE 0.12 MG/1
0.25 TABLET SUBLINGUAL
Status: COMPLETED | OUTPATIENT
Start: 2025-05-18 | End: 2025-05-18

## 2025-05-18 RX ORDER — LIDOCAINE HYDROCHLORIDE 20 MG/ML
15 SOLUTION OROPHARYNGEAL ONCE
Status: COMPLETED | OUTPATIENT
Start: 2025-05-18 | End: 2025-05-18

## 2025-05-18 RX ORDER — FAMOTIDINE 10 MG/ML
20 INJECTION, SOLUTION INTRAVENOUS
Status: COMPLETED | OUTPATIENT
Start: 2025-05-18 | End: 2025-05-18

## 2025-05-18 RX ADMIN — SODIUM CHLORIDE, POTASSIUM CHLORIDE, SODIUM LACTATE AND CALCIUM CHLORIDE 1000 ML: 600; 310; 30; 20 INJECTION, SOLUTION INTRAVENOUS at 03:05

## 2025-05-18 RX ADMIN — ALUMINUM HYDROXIDE, MAGNESIUM HYDROXIDE, AND DIMETHICONE 30 ML: 200; 20; 200 SUSPENSION ORAL at 03:05

## 2025-05-18 RX ADMIN — HYOSCYAMINE SULFATE 0.25 MG: 0.12 TABLET SUBLINGUAL at 03:05

## 2025-05-18 RX ADMIN — FAMOTIDINE 20 MG: 10 INJECTION, SOLUTION INTRAVENOUS at 03:05

## 2025-05-18 RX ADMIN — LIDOCAINE HYDROCHLORIDE 15 ML: 20 SOLUTION ORAL at 03:05

## 2025-05-18 NOTE — DISCHARGE INSTRUCTIONS
Please avoid spicy greasy acidic foods for the next 3-4 days and then reintroduce foods back into your diet.  You can also use over-the-counter Maalox as needed for belching.    Follow up with family doctor in the next 2-3 days for medical re-evaluation or sooner in the emergency department for any worsening symptoms or concerns.

## 2025-05-18 NOTE — ED PROVIDER NOTES
"Encounter Date: 5/18/2025       History     Chief Complaint   Patient presents with    Shortness of Breath     Pt stated that he began experiencing worsening dyspnea and "reflux" since earlier today. Hx afib / valve replacement / cardioversion.      Patient presents with a acid reflux and dizziness.  Patient states that he had use the bathroom earlier today he had defecated when he stood up he walked several steps felt lightheaded which resolved on its own.  This is his 1 time episode and did not have any motor or sensory deficits.  No recent nausea vomiting diarrhea chest pain or shortness of breath.  No recent illnesses.  He also states he has been belching since this morning he did eat at a Chinese buffet last night.  No vomiting.  He states he feels a burning in his throat from the belching.      Review of patient's allergies indicates:  No Known Allergies  Past Medical History:   Diagnosis Date    Acute exacerbation of chronic bronchitis 04/11/2022    Arthralgia of knee     Atrial fibrillation     CAD (coronary artery disease)     Calculus of gallbladder without cholecystitis without obstruction 03/30/2023    Chronic diastolic HF (heart failure), NYHA class 2     Dyslipidemia     Edema     GERD (gastroesophageal reflux disease)     Heartburn     HTN (hypertension)     Hyperlipidemia     Insomnia     Iron deficiency anemia, unspecified     Malaise and fatigue     Moderate aortic stenosis     Orthostasis     Orthostasis     PAF (paroxysmal atrial fibrillation)     Renal disorder     HX OF KIDNEY STONES    Severe aortic stenosis     Sleep apnea     NO CPAP    Unspecified chronic bronchitis     Unspecified sensorineural hearing loss     Wears dentures     FULL     Past Surgical History:   Procedure Laterality Date    CARDIOVERSION N/A 07/19/2022    Procedure: Cardioversion;  Surgeon: William Angel MD;  Location: [facility] ENDO;  Service: Cardiology;  Laterality: N/A;    COLONOSCOPY  01/11/2011    Dr. Chavez    " COLONOSCOPY, WITH POLYPECTOMY USING HOT BIOPSY FORCEPS N/A 11/6/2023    Procedure: COLONOSCOPY, WITH POLYPECTOMY USING COLD BIOPSY FORCEPS;  Surgeon: Carl Chavez MD;  Location: [facility];  Service: General;  Laterality: N/A;  2nd 0600    CORONARY ANGIOGRAPHY N/A 11/19/2024    Procedure: ANGIOGRAM, CORONARY ARTERY;  Surgeon: Varun Berg MD;  Location: [facility] CATH;  Service: Cardiology;  Laterality: N/A;    LAPAROSCOPIC CHOLECYSTECTOMY  2023    PERCUTANEOUS TRANSCATHETER AORTIC VALVE REPLACEMENT (TAVR) Right 2/11/2025    Procedure: REPLACEMENT, AORTIC VALVE, PERCUTANEOUS, TRANSCATHETER;  Surgeon: Varun Berg MD;  Location: [facility] CATH;  Service: Cardiology;  Laterality: Right;  ops # 21    PERCUTANEOUS TRANSCATHETER AORTIC VALVE REPLACEMENT (TAVR) Right 2/11/2025    Procedure: REPLACEMENT, AORTIC VALVE, PERCUTANEOUS, TRANSCATHETER;  Surgeon: Clemencia Ramirez MD;  Location: [facility] CATH;  Service: Cardiovascular;  Laterality: Right;    TREATMENT OF CARDIAC ARRHYTHMIA N/A 5/2/2025    Procedure: Cardioversion or Defibrillation;  Surgeon: Jevon Harrison MD;  Location: [facility] CATH;  Service: Cardiology;  Laterality: N/A;     Family History   Problem Relation Name Age of Onset    Diabetes Mother      Heart murmur Mother      Cancer Father      Tuberculosis Father      No Known Problems Sister      Brain cancer Sister      Cancer Brother      Brain cancer Brother      No Known Problems Brother      No Known Problems Brother      No Known Problems Brother       Social History[1]  Review of Systems   Gastrointestinal:         Acid reflux   Neurological:  Positive for dizziness.   All other systems reviewed and are negative.      Physical Exam     Initial Vitals [05/18/25 1331]   BP Pulse Resp Temp SpO2   (!) 150/79 76 (!) 22 98.3 °F (36.8 °C) 98 %      MAP       --         Physical Exam    Nursing note and vitals reviewed.  Constitutional: He appears well-developed and well-nourished.   Well-appearing for stated age    HENT:   Head: Normocephalic and atraumatic.   Mildly dry oral mucous membranes   Eyes: EOM are normal. Pupils are equal, round, and reactive to light.   Neck:   Normal range of motion.  Cardiovascular:  Normal rate, regular rhythm and normal heart sounds.           No murmur heard.  Pulmonary/Chest: Breath sounds normal. No respiratory distress. He has no wheezes. He has no rales.   Abdominal: Abdomen is soft. Bowel sounds are normal. He exhibits no distension. There is no abdominal tenderness.   Intermittently belching during examination, not hiccuping There is no rebound.   Musculoskeletal:         General: Normal range of motion.      Cervical back: Normal range of motion.     Neurological: He is alert and oriented to person, place, and time.   Psychiatric: He has a normal mood and affect.         ED Course   Procedures  Labs Reviewed   COMPREHENSIVE METABOLIC PANEL - Abnormal       Result Value    Sodium 140      Potassium 4.5      Chloride 106      CO2 23      Glucose 124 (*)     BUN 47 (*)     Creatinine 1.2      Calcium 8.9      Protein Total 7.4      Albumin 3.6      Bilirubin Total 0.4      ALP 66      AST 36      ALT 28      Anion Gap 11      eGFR >60     CBC WITH DIFFERENTIAL - Abnormal    WBC 10.57      RBC 4.07 (*)     HGB 12.0 (*)     HCT 37.7 (*)     MCV 93      MCH 29.5      MCHC 31.8 (*)     RDW 14.6 (*)     Platelet Count 235      MPV 11.3      Nucleated RBC 0      Neut % 61.7      Lymph % 24.1      Mono % 11.7      Eos % 1.6      Basophil % 0.6      Imm Grans % 0.3      Neut # 6.52      Lymph # 2.55      Mono # 1.24 (*)     Eos # 0.17      Baso # 0.06      Imm Grans # 0.03     TROPONIN I HIGH SENSITIVITY - Normal    Troponin High Sensitive 3     CBC W/ AUTO DIFFERENTIAL    Narrative:     The following orders were created for panel order CBC auto differential.  Procedure                               Abnormality         Status                     ---------                                -----------         ------                     CBC with Differential[[id number]]       Abnormal            Final result                 Please view results for these tests on the individual orders.   EXTRA TUBES    Narrative:     The following orders were created for panel order EXTRA TUBES.  Procedure                               Abnormality         Status                     ---------                               -----------         ------                     Light Green Top Hold[[id number]]                            In process                   Please view results for these tests on the individual orders.   LIGHT GREEN TOP HOLD     EKG Readings: (Independently Interpreted)   Time 1:28 p.m.   Rate 73, normal sinus rhythm, left bundle-branch block, does not meet Sgarbossa criteria       Imaging Results    None          Medications   lactated ringers bolus 1,000 mL (1,000 mLs Intravenous New Bag 5/18/25 1515)   famotidine (PF) injection 20 mg (20 mg Intravenous Given 5/18/25 1516)   aluminum-magnesium hydroxide-simethicone 200-200-20 mg/5 mL suspension 30 mL (30 mLs Oral Given 5/18/25 1516)     And   LIDOcaine viscous HCl 2% oral solution 15 mL (15 mLs Oral Given 5/18/25 1516)   hyoscyamine SL tablet 0.25 mg (0.25 mg Sublingual Given 5/18/25 1521)     Medical Decision Making  Amount and/or Complexity of Data Reviewed  Labs: ordered.    Risk  OTC drugs.  Prescription drug management.                          Medical Decision Making:   Initial Assessment:   Patient well-appearing for stated age workup in progress patient has been feeling sour taste in his throat with belching unlikely ACS due to feeling the actual acid component of his acid reflux and not just sensation of acid reflux.  In regards to his dizziness with a 1 time event it happened when he stood up after using the restroom and defecating.  He does have mildly dry oral mucous membranes.  Regards to his belching it started this morning after eating at a  Chinese buffet last night.  Workup in progress.  Will provide GI cocktail, lifting, and IV Pepcid with fluids  Differential Diagnosis:   Gastritis, esophageal spasms, dehydration, ACS, CVA, electrolyte derangement  ED Management:  Time 3:59 p.m.   All patient's labs within normal limits nonsignificant patient states he feels much better after fluids and GI cocktail and Pepcid.  Advised to avoid any spicy greasy acidic foods over the next several days.  Return precautions provided patient understands and agrees with plan of care             Clinical Impression:  Final diagnoses:  [R14.2] Belching (Primary)  [E86.0] Dehydration          ED Disposition Condition    Discharge Stable          ED Prescriptions    None       Follow-up Information    None              [1]   Social History  Tobacco Use    Smoking status: Former     Current packs/day: 0.00     Types: Cigarettes     Quit date:      Years since quittin.3     Passive exposure: Past    Smokeless tobacco: Never   Substance Use Topics    Alcohol use: Not Currently     Alcohol/week: 6.0 standard drinks of alcohol     Types: 6 Cans of beer per week    Drug use: Never        Esqeuiel Barr MD  25 1600

## 2025-05-18 NOTE — ED NOTES
AAOx4, skin W/D/P, cap refill<3 sec, MAEW, NAD, gait steady , patient wheeled and assisted into vehicle with ease.

## 2025-05-19 LAB
OHS QRS DURATION: 126 MS
OHS QTC CALCULATION: 467 MS

## (undated) DEVICE — UNDERPAD DISPOSABLE 30X30IN

## (undated) DEVICE — CANNULA SSOFT C02 MALE 14FT

## (undated) DEVICE — FORCEP ALLIGATOR 2.8MM W/NDL

## (undated) DEVICE — ELECTRODE MEDI-TRACE 855 FOAM

## (undated) DEVICE — SOL IRRI STRL WATER 1000ML

## (undated) DEVICE — SPONGE DRY VIA GREEN

## (undated) DEVICE — KIT VIA CUSTOM PROCEDURE

## (undated) DEVICE — BASIN EMESIS GRAPHITE 500ML

## (undated) DEVICE — LINER SUCTION CANNISTER REGUGA

## (undated) DEVICE — CONNECTOR WATERJET ENDO

## (undated) DEVICE — KIT BIOGUARD AIR WTR SUC VALVE

## (undated) DEVICE — GOWN ECLIPSE REINF LVL4 TWL XL